# Patient Record
Sex: MALE | Race: WHITE | NOT HISPANIC OR LATINO
[De-identification: names, ages, dates, MRNs, and addresses within clinical notes are randomized per-mention and may not be internally consistent; named-entity substitution may affect disease eponyms.]

---

## 2017-01-17 ENCOUNTER — TRANSCRIPTION ENCOUNTER (OUTPATIENT)
Age: 72
End: 2017-01-17

## 2017-02-24 ENCOUNTER — TRANSCRIPTION ENCOUNTER (OUTPATIENT)
Age: 72
End: 2017-02-24

## 2017-12-16 ENCOUNTER — TRANSCRIPTION ENCOUNTER (OUTPATIENT)
Age: 72
End: 2017-12-16

## 2018-06-12 ENCOUNTER — TRANSCRIPTION ENCOUNTER (OUTPATIENT)
Age: 73
End: 2018-06-12

## 2019-01-14 ENCOUNTER — TRANSCRIPTION ENCOUNTER (OUTPATIENT)
Age: 74
End: 2019-01-14

## 2019-02-12 ENCOUNTER — NON-APPOINTMENT (OUTPATIENT)
Age: 74
End: 2019-02-12

## 2019-02-12 ENCOUNTER — APPOINTMENT (OUTPATIENT)
Dept: CARDIOLOGY | Facility: CLINIC | Age: 74
End: 2019-02-12
Payer: MEDICARE

## 2019-02-12 VITALS
DIASTOLIC BLOOD PRESSURE: 82 MMHG | HEIGHT: 69 IN | BODY MASS INDEX: 31.84 KG/M2 | WEIGHT: 215 LBS | HEART RATE: 75 BPM | SYSTOLIC BLOOD PRESSURE: 134 MMHG

## 2019-02-12 DIAGNOSIS — Z87.891 PERSONAL HISTORY OF NICOTINE DEPENDENCE: ICD-10-CM

## 2019-02-12 DIAGNOSIS — Z78.9 OTHER SPECIFIED HEALTH STATUS: ICD-10-CM

## 2019-02-12 PROCEDURE — 99205 OFFICE O/P NEW HI 60 MIN: CPT

## 2019-02-12 PROCEDURE — 93000 ELECTROCARDIOGRAM COMPLETE: CPT

## 2019-02-12 NOTE — PHYSICAL EXAM
[General Appearance - Well Developed] : well developed [Normal Appearance] : normal appearance [Well Groomed] : well groomed [General Appearance - Well Nourished] : well nourished [No Deformities] : no deformities [General Appearance - In No Acute Distress] : no acute distress [Normal Conjunctiva] : the conjunctiva exhibited no abnormalities [Eyelids - No Xanthelasma] : the eyelids demonstrated no xanthelasmas [Normal Oral Mucosa] : normal oral mucosa [No Oral Pallor] : no oral pallor [No Oral Cyanosis] : no oral cyanosis [FreeTextEntry1] : decr upstroke [Heart Rate And Rhythm] : heart rate and rhythm were normal [Heart Sounds] : normal S1 and S2 [Murmurs] : no murmurs present [Respiration, Rhythm And Depth] : normal respiratory rhythm and effort [Exaggerated Use Of Accessory Muscles For Inspiration] : no accessory muscle use [Auscultation Breath Sounds / Voice Sounds] : lungs were clear to auscultation bilaterally [Abdomen Soft] : soft [Abdomen Tenderness] : non-tender [Abdomen Mass (___ Cm)] : no abdominal mass palpated [Abnormal Walk] : normal gait [Gait - Sufficient For Exercise Testing] : the gait was sufficient for exercise testing [Nail Clubbing] : no clubbing of the fingernails [Cyanosis, Localized] : no localized cyanosis [Petechial Hemorrhages (___cm)] : no petechial hemorrhages [Skin Color & Pigmentation] : normal skin color and pigmentation [] : no rash [No Venous Stasis] : no venous stasis [Skin Lesions] : no skin lesions [No Skin Ulcers] : no skin ulcer [No Xanthoma] : no  xanthoma was observed [Oriented To Time, Place, And Person] : oriented to person, place, and time [Affect] : the affect was normal [Mood] : the mood was normal [No Anxiety] : not feeling anxious

## 2019-02-12 NOTE — HISTORY OF PRESENT ILLNESS
[FreeTextEntry1] : TANA MCKEON  is a 73 year old  M\par History of hypertension, diabetes, family history of cardiovascular disease\par \par Recent exertional symptoms. Symptoms started over past few months. Has symptoms most days. Symptoms last for minutes. \par Describes it as a pinching sensation in his chest. \par There is radiation to his shoulder bilaterally.\par Symptoms associated with activity such as walking uphill with his extra large dog. \par Recent sinus infection and symptoms of fatigue. \par There is no orthopnea. \par There are no symptomatic palpitations, lightheadedness, dizziness or syncope.\par \par History of hypertension, maintained on lisinopril. Reports home blood pressure previously 150s now 130s. \par There is no prior history of a clinical myocardial infarction, coronary revascularization. \par There is no history of symptomatic congestive heart failure rheumatic heart disease or valvular disease.\par There is no history of symptomatic arrhythmias including atrial fibrillation.\par \par EKG demonstrates normal sinus rhythm with left anterior fascicular block poor R wave progression and nonspecific ST changes. \par \[place]. \par Significant family history of carotid disease and father had initial cardiac event in his 50s.\par Underwent remote evaluation at [facility] and at that time was told his heart was enlarged. \par Previously underwent executive physical. \par \par Outside stress test 2008 diaphragmatic attenuation. \par Outside echocardiogram 2006 normal left ventricular function, mild mitral regurgitation, mild aortic dilatation\par November 2018, potassium 5.0, creatinine 1.1, hemoglobin A1c 7.6, HDL 48,

## 2019-02-12 NOTE — REASON FOR VISIT
[Consultation] : a consultation regarding [Chest Pain] : chest pain [Hypertension] : hypertension [Medication Management] : Medication management

## 2019-02-12 NOTE — ASSESSMENT
[FreeTextEntry1] : Exertional symptoms. \par Multiple coronary risk factors, including diabetes, hypertension, family history CVD\par Family history of carotid disease. \par Abnormal EKG. \par \par Daily aspirin therapy. \par Exercise nuclear stress test to evaluate cardiovascular response to exercise and myocardial perfusion. \par Followup carotid ultrasound. \par Screen for abdominal aneurysm. \par \par Likely requires further antihypertensive and statin therapy. \par Return to office with home blood pressure monitor and log.\par Outside blood work requested. \par \par If change in symptoms. 911/immediate reevaluation.\par

## 2019-02-14 RX ORDER — LISINOPRIL 20 MG/1
20 TABLET ORAL
Refills: 0 | Status: DISCONTINUED | COMMUNITY
End: 2019-02-14

## 2019-02-26 ENCOUNTER — APPOINTMENT (OUTPATIENT)
Dept: CARDIOLOGY | Facility: CLINIC | Age: 74
End: 2019-02-26
Payer: MEDICARE

## 2019-02-26 PROCEDURE — 93880 EXTRACRANIAL BILAT STUDY: CPT

## 2019-02-26 PROCEDURE — 93306 TTE W/DOPPLER COMPLETE: CPT

## 2019-02-26 PROCEDURE — 93015 CV STRESS TEST SUPVJ I&R: CPT

## 2019-02-26 PROCEDURE — 78452 HT MUSCLE IMAGE SPECT MULT: CPT

## 2019-02-26 PROCEDURE — A9502: CPT

## 2019-02-26 PROCEDURE — 93979 VASCULAR STUDY: CPT

## 2019-02-27 ENCOUNTER — APPOINTMENT (OUTPATIENT)
Dept: CARDIOLOGY | Facility: CLINIC | Age: 74
End: 2019-02-27
Payer: MEDICARE

## 2019-02-27 VITALS
HEART RATE: 77 BPM | HEIGHT: 69 IN | WEIGHT: 207 LBS | OXYGEN SATURATION: 97 % | DIASTOLIC BLOOD PRESSURE: 70 MMHG | SYSTOLIC BLOOD PRESSURE: 140 MMHG | BODY MASS INDEX: 30.66 KG/M2

## 2019-02-27 DIAGNOSIS — Z87.19 PERSONAL HISTORY OF OTHER DISEASES OF THE DIGESTIVE SYSTEM: ICD-10-CM

## 2019-02-27 DIAGNOSIS — Z83.438 FAMILY HISTORY OF OTHER DISORDER OF LIPOPROTEIN METABOLISM AND OTHER LIPIDEMIA: ICD-10-CM

## 2019-02-27 DIAGNOSIS — Z82.49 FAMILY HISTORY OF ISCHEMIC HEART DISEASE AND OTHER DISEASES OF THE CIRCULATORY SYSTEM: ICD-10-CM

## 2019-02-27 DIAGNOSIS — Z87.09 PERSONAL HISTORY OF OTHER DISEASES OF THE RESPIRATORY SYSTEM: ICD-10-CM

## 2019-02-27 DIAGNOSIS — Z82.3 FAMILY HISTORY OF STROKE: ICD-10-CM

## 2019-02-27 PROCEDURE — 99214 OFFICE O/P EST MOD 30 MIN: CPT

## 2019-03-03 NOTE — REASON FOR VISIT
[Hypertension] : hypertension [Medication Management] : Medication management [Follow-Up - Clinic] : a clinic follow-up of [Hyperlipidemia] : hyperlipidemia

## 2019-03-03 NOTE — ASSESSMENT
[FreeTextEntry1] : Subclinical atherosclerosis\par Mild valvular heart disease\par Borderline aortic dilatation\par Multiple coronary risk factors, including diabetes, hypertension, family history CVD\par No significant ischemic heart disease\par No LV dysfunction\par No indication for invasive therapy at this time. \par \par Continue aspirin therapy. \par Continue statin therapy. \par Followup blood work has been requested. \par Followup with primary physician regarding glucose control.\par Monitor home BP \par \par If change in symptoms. immediate reevaluation.\par

## 2019-03-03 NOTE — PHYSICAL EXAM
[General Appearance - Well Developed] : well developed [Normal Appearance] : normal appearance [Well Groomed] : well groomed [General Appearance - Well Nourished] : well nourished [No Deformities] : no deformities [General Appearance - In No Acute Distress] : no acute distress [Normal Conjunctiva] : the conjunctiva exhibited no abnormalities [Eyelids - No Xanthelasma] : the eyelids demonstrated no xanthelasmas [Normal Oral Mucosa] : normal oral mucosa [No Oral Pallor] : no oral pallor [No Oral Cyanosis] : no oral cyanosis [Respiration, Rhythm And Depth] : normal respiratory rhythm and effort [Exaggerated Use Of Accessory Muscles For Inspiration] : no accessory muscle use [Auscultation Breath Sounds / Voice Sounds] : lungs were clear to auscultation bilaterally [Heart Rate And Rhythm] : heart rate and rhythm were normal [Heart Sounds] : normal S1 and S2 [Murmurs] : no murmurs present [Abdomen Soft] : soft [Abdomen Tenderness] : non-tender [Abdomen Mass (___ Cm)] : no abdominal mass palpated [Abnormal Walk] : normal gait [Gait - Sufficient For Exercise Testing] : the gait was sufficient for exercise testing [Nail Clubbing] : no clubbing of the fingernails [Cyanosis, Localized] : no localized cyanosis [Petechial Hemorrhages (___cm)] : no petechial hemorrhages [Skin Color & Pigmentation] : normal skin color and pigmentation [] : no rash [No Venous Stasis] : no venous stasis [Skin Lesions] : no skin lesions [No Skin Ulcers] : no skin ulcer [No Xanthoma] : no  xanthoma was observed [Oriented To Time, Place, And Person] : oriented to person, place, and time [Affect] : the affect was normal [Mood] : the mood was normal [No Anxiety] : not feeling anxious [FreeTextEntry1] : decr upstroke

## 2019-03-03 NOTE — HISTORY OF PRESENT ILLNESS
[FreeTextEntry1] : TANA MCKEON  is a 73 year old  M\par History of hypertension, diabetes, family history of cardiovascular disease, atheroslcerosis, mld VHD\par \par Recent exertional symptoms. Symptoms started over past few months. Has symptoms most days. Symptoms last for minutes. \par Describes it as a pinching sensation in his chest. \par There is radiation to his shoulder bilaterally.\par Symptoms associated with activity such as walking uphill with his extra large dog. \par Recent sinus infection and symptoms of fatigue. \par There are symptoms of fatigue. \par Monitor his blood pressure home.  Home blood pressure monitor is accurate.\par There is no orthopnea. \par There are no symptomatic palpitations, lightheadedness, dizziness or syncope.\par \par There is no prior history of a clinical myocardial infarction, coronary revascularization. \par There is no history of symptomatic congestive heart failure rheumatic heart disease or valvular disease.\par There is no history of symptomatic arrhythmias including atrial fibrillation.\par \par After last office visit I initiated statin therapy and diuretic\par \par EKG demonstrates normal sinus rhythm with left anterior fascicular block poor R wave progression and nonspecific ST changes. \par \[place] from [employer] industry. \par Significant family history of carotid disease and father had initial cardiac event in his 50s.\par November 2018, potassium 5.0, creatinine 1.1, hemoglobin A1c 7.6, HDL 48,  \par  \par Recent noninvasive testing has been reviewed. \par Carotid Doppler demonstrated mild atherosclerosis. \par Echocardiogram demonstrates normal left ventricular function with mild valvular heart disease and borderline aortic dilatation. \par Abdominal ultrasound demonstrates aortic atherosclerosis. No aneurysm.\par Nuclear stress test. No ischemia. \par

## 2019-05-28 ENCOUNTER — APPOINTMENT (OUTPATIENT)
Dept: CARDIOLOGY | Facility: CLINIC | Age: 74
End: 2019-05-28
Payer: MEDICARE

## 2019-05-28 VITALS
HEIGHT: 69 IN | SYSTOLIC BLOOD PRESSURE: 142 MMHG | DIASTOLIC BLOOD PRESSURE: 82 MMHG | HEART RATE: 84 BPM | BODY MASS INDEX: 30.96 KG/M2 | WEIGHT: 209 LBS

## 2019-05-28 PROCEDURE — 99213 OFFICE O/P EST LOW 20 MIN: CPT

## 2019-05-28 NOTE — HISTORY OF PRESENT ILLNESS
[FreeTextEntry1] : TANA MCKEON  is a 73 year old  M\par \par History of hypertension, diabetes, family history of cardiovascular disease, atheroslcerosis, mild VHD\par \par Active without limitations\par No chest pain\par There is no orthopnea. \par There are no symptomatic palpitations, lightheadedness, dizziness or syncope.\par Minor musculoskeletal complaints.\par \par There is no prior history of a clinical myocardial infarction, coronary revascularization. \par There is no history of symptomatic congestive heart failure rheumatic heart disease\par There is no history of symptomatic arrhythmias including atrial fibrillation.\par \par Presently, he is out of his blood pressure medication.\par \par November 2018, potassium 5.0, creatinine 1.1, hemoglobin A1c 7.6, HDL 48,  \par February 2019. Potassium 4.4, creatinine 1.1, hemoglobin A1c 7.0\par Blood work, March 2019. Hemoglobin 15.1, TSH 1.1 \par \par EKG demonstrates normal sinus rhythm with left anterior fascicular block poor R wave progression and nonspecific ST changes. \par \[place] from [employer] industry. \par Significant family history of carotid disease and father had initial cardiac event in his 50s.\par  \par Recent noninvasive testing has been reviewed. \par Carotid Doppler demonstrated mild atherosclerosis. \par Echocardiogram demonstrates normal left ventricular function with mild valvular heart disease and borderline aortic dilatation. \par Abdominal ultrasound demonstrates aortic atherosclerosis. No aneurysm.\par Nuclear stress test. No ischemia. \par

## 2019-05-28 NOTE — REVIEW OF SYSTEMS
[Negative] : Heme/Lymph [Chest Pain] : no chest pain [see HPI] : see HPI [Joint Stiffness] : joint stiffness [Joint Pain] : joint pain

## 2019-05-28 NOTE — ASSESSMENT
[FreeTextEntry1] : Subclinical atherosclerosis\par Mild valvular heart disease\par Borderline aortic dilatation\par Multiple coronary risk factors, including diabetes, hypertension, family history CVD\par No significant ischemic heart disease\par No LV dysfunction\par No indication for invasive therapy at this time. \par \par Refilled antihypertensive. \par Continue aspirin therapy. \par Continue statin therapy. \par Glucose management with primary physician.\par \par If change in symptoms, immediate reevaluation.\par Blood work has been requested. \par

## 2019-07-10 ENCOUNTER — OUTPATIENT (OUTPATIENT)
Dept: OUTPATIENT SERVICES | Facility: HOSPITAL | Age: 74
LOS: 1 days | End: 2019-07-10

## 2019-08-20 ENCOUNTER — RX RENEWAL (OUTPATIENT)
Age: 74
End: 2019-08-20

## 2019-09-03 ENCOUNTER — OUTPATIENT (OUTPATIENT)
Dept: OUTPATIENT SERVICES | Facility: HOSPITAL | Age: 74
LOS: 1 days | End: 2019-09-03

## 2019-09-06 ENCOUNTER — APPOINTMENT (OUTPATIENT)
Dept: ULTRASOUND IMAGING | Facility: CLINIC | Age: 74
End: 2019-09-06

## 2019-09-06 ENCOUNTER — APPOINTMENT (OUTPATIENT)
Dept: MRI IMAGING | Facility: CLINIC | Age: 74
End: 2019-09-06
Payer: MEDICARE

## 2019-09-06 PROCEDURE — 76882 US LMTD JT/FCL EVL NVASC XTR: CPT | Mod: LT

## 2019-09-06 PROCEDURE — 72148 MRI LUMBAR SPINE W/O DYE: CPT

## 2019-09-17 ENCOUNTER — TRANSCRIPTION ENCOUNTER (OUTPATIENT)
Age: 74
End: 2019-09-17

## 2019-10-07 ENCOUNTER — OUTPATIENT (OUTPATIENT)
Dept: OUTPATIENT SERVICES | Facility: HOSPITAL | Age: 74
LOS: 1 days | End: 2019-10-07
Payer: MEDICARE

## 2019-10-07 PROCEDURE — 93010 ELECTROCARDIOGRAM REPORT: CPT

## 2019-10-21 ENCOUNTER — OUTPATIENT (OUTPATIENT)
Dept: OUTPATIENT SERVICES | Facility: HOSPITAL | Age: 74
LOS: 1 days | End: 2019-10-21

## 2019-11-20 ENCOUNTER — RECORD ABSTRACTING (OUTPATIENT)
Age: 74
End: 2019-11-20

## 2019-12-03 ENCOUNTER — APPOINTMENT (OUTPATIENT)
Dept: CARDIOLOGY | Facility: CLINIC | Age: 74
End: 2019-12-03
Payer: MEDICARE

## 2019-12-03 VITALS
SYSTOLIC BLOOD PRESSURE: 110 MMHG | DIASTOLIC BLOOD PRESSURE: 62 MMHG | HEART RATE: 90 BPM | OXYGEN SATURATION: 95 % | WEIGHT: 206 LBS | HEIGHT: 69 IN | BODY MASS INDEX: 30.51 KG/M2

## 2019-12-03 DIAGNOSIS — Z00.00 ENCOUNTER FOR GENERAL ADULT MEDICAL EXAMINATION W/OUT ABNORMAL FINDINGS: ICD-10-CM

## 2019-12-03 PROCEDURE — 99213 OFFICE O/P EST LOW 20 MIN: CPT

## 2019-12-03 NOTE — REASON FOR VISIT
[Follow-Up - Clinic] : a clinic follow-up of [Hyperlipidemia] : hyperlipidemia [Medication Management] : Medication management [Hypertension] : hypertension

## 2019-12-09 NOTE — ASSESSMENT
[FreeTextEntry1] : \par Atherosclerosis\par Mild valvular heart disease\par Borderline aortic dilatation\par Multiple coronary risk factors, including diabetes, hypertension, family history CVD\par No significant ischemic heart disease\par No LV dysfunction\par No indication for invasive therapy at this time. \par \par Continue aspirin therapy. \par Continue antihypertensive. \par Continue statin therapy. \par Glucose management with primary physician.\par \par Followup blood work has been requested. Increase aerobic exercise program as tolerated.\par \par If change in symptoms, immediate reevaluation.\par

## 2019-12-09 NOTE — HISTORY OF PRESENT ILLNESS
[FreeTextEntry1] : TANA MCKEON  is a 74 year old  M\par \par \par History of hypertension, diabetes, family history of cardiovascular disease, atheroslcerosis, mild VHD\par \par There is no prior history of a clinical myocardial infarction, coronary revascularization. \par There is no history of symptomatic congestive heart failure rheumatic heart disease\par There is no history of symptomatic arrhythmias including atrial fibrillation.\par \par Recently, he had surgery, and overall less active. \par There is minor weight loss.\par No chest pain\par There is no orthopnea. \par There are no symptomatic palpitations, lightheadedness, dizziness or syncope.\par Minor musculoskeletal complaints.\par \[place] from [employer]. \par Significant family history of carotid disease and father had initial cardiac event in his 50s.\par \par May 2019, hemoglobin 14.7, creatinine 1.1, LDL 64, A1c 7.3, TSH 1.7. \par \par EKG demonstrates normal sinus rhythm with left anterior fascicular block poor R wave progression and nonspecific ST changes. \par \par Carotid Doppler 2/19 demonstrated mild atherosclerosis. \par Echocardiogram 2/19 demonstrated normal left ventricular function with mild valvular heart disease and borderline aortic dilatation. \par Abdominal ultrasound 2/19 demonstrates aortic atherosclerosis. No aneurysm.\par Nuclear stress test 2/19. No ischemia.

## 2019-12-09 NOTE — REVIEW OF SYSTEMS
[see HPI] : see HPI [Joint Stiffness] : joint stiffness [Joint Pain] : joint pain [Negative] : Heme/Lymph [Chest Pain] : no chest pain

## 2020-11-23 ENCOUNTER — NON-APPOINTMENT (OUTPATIENT)
Age: 75
End: 2020-11-23

## 2020-11-23 DIAGNOSIS — K57.90 DIVERTICULOSIS OF INTESTINE, PART UNSPECIFIED, W/OUT PERFORATION OR ABSCESS W/OUT BLEEDING: ICD-10-CM

## 2020-12-03 ENCOUNTER — APPOINTMENT (OUTPATIENT)
Dept: INTERNAL MEDICINE | Facility: CLINIC | Age: 75
End: 2020-12-03
Payer: MEDICARE

## 2020-12-03 VITALS
BODY MASS INDEX: 30.81 KG/M2 | SYSTOLIC BLOOD PRESSURE: 160 MMHG | DIASTOLIC BLOOD PRESSURE: 78 MMHG | HEIGHT: 69 IN | WEIGHT: 208 LBS

## 2020-12-03 VITALS — SYSTOLIC BLOOD PRESSURE: 136 MMHG | DIASTOLIC BLOOD PRESSURE: 86 MMHG

## 2020-12-03 DIAGNOSIS — Z01.84 ENCOUNTER FOR ANTIBODY RESPONSE EXAMINATION: ICD-10-CM

## 2020-12-03 PROCEDURE — 36415 COLL VENOUS BLD VENIPUNCTURE: CPT

## 2020-12-03 PROCEDURE — 99214 OFFICE O/P EST MOD 30 MIN: CPT | Mod: 25

## 2020-12-03 NOTE — REVIEW OF SYSTEMS
[Fever] : no fever [Chills] : no chills [Chest Pain] : no chest pain [Palpitations] : no palpitations [Lower Ext Edema] : no lower extremity edema [Orthopena] : no orthopnea [Shortness Of Breath] : no shortness of breath [Wheezing] : no wheezing [Abdominal Pain] : no abdominal pain [Nausea] : no nausea [Diarrhea] : no diarrhea [Vomiting] : no vomiting [Dysuria] : no dysuria [Muscle Pain] : no muscle pain [Muscle Weakness] : no muscle weakness [Joint Swelling] : no joint swelling [Skin Rash] : no skin rash [Headache] : no headache [Dizziness] : no dizziness [Fainting] : no fainting [Memory Loss] : no memory loss [Insomnia] : no insomnia

## 2020-12-03 NOTE — HISTORY OF PRESENT ILLNESS
[de-identified] : 76 y/o male is in the office for follow up, yearly checkup and fasting labs.\ariana Has been well without any recent illness or other problems.\ariana Has been compliant with his medications.\ariana Is also asking about Cialis use.\par Blood pressure has been good when checked.

## 2020-12-03 NOTE — PHYSICAL EXAM
[No Respiratory Distress] : no respiratory distress  [Clear to Auscultation] : lungs were clear to auscultation bilaterally [Normal] : normal rate, regular rhythm, normal S1 and S2 and no murmur heard [No Carotid Bruits] : no carotid bruits [No Edema] : there was no peripheral edema [Soft] : abdomen soft [Non Tender] : non-tender [No Rash] : no rash [No Focal Deficits] : no focal deficits [Normal Gait] : normal gait [Normal Insight/Judgement] : insight and judgment were intact

## 2020-12-04 LAB
ALBUMIN SERPL ELPH-MCNC: 4.6 G/DL
ALP BLD-CCNC: 61 U/L
ALT SERPL-CCNC: 52 U/L
ANION GAP SERPL CALC-SCNC: 14 MMOL/L
AST SERPL-CCNC: 42 U/L
BASOPHILS # BLD AUTO: 0.07 K/UL
BASOPHILS NFR BLD AUTO: 1.4 %
BILIRUB SERPL-MCNC: 0.8 MG/DL
BUN SERPL-MCNC: 10 MG/DL
CALCIUM SERPL-MCNC: 9.3 MG/DL
CHLORIDE SERPL-SCNC: 99 MMOL/L
CHOLEST SERPL-MCNC: 198 MG/DL
CO2 SERPL-SCNC: 22 MMOL/L
CREAT SERPL-MCNC: 1.17 MG/DL
EOSINOPHIL # BLD AUTO: 0.1 K/UL
EOSINOPHIL NFR BLD AUTO: 1.9 %
GLUCOSE SERPL-MCNC: 150 MG/DL
HCT VFR BLD CALC: 46.5 %
HDLC SERPL-MCNC: 51 MG/DL
HGB BLD-MCNC: 15.4 G/DL
IMM GRANULOCYTES NFR BLD AUTO: 0.6 %
LDLC SERPL CALC-MCNC: 123 MG/DL
LYMPHOCYTES # BLD AUTO: 1.36 K/UL
LYMPHOCYTES NFR BLD AUTO: 26.5 %
MAN DIFF?: NORMAL
MCHC RBC-ENTMCNC: 29.9 PG
MCHC RBC-ENTMCNC: 33.1 GM/DL
MCV RBC AUTO: 90.3 FL
MONOCYTES # BLD AUTO: 0.5 K/UL
MONOCYTES NFR BLD AUTO: 9.7 %
NEUTROPHILS # BLD AUTO: 3.08 K/UL
NEUTROPHILS NFR BLD AUTO: 59.9 %
NONHDLC SERPL-MCNC: 147 MG/DL
PLATELET # BLD AUTO: 237 K/UL
POTASSIUM SERPL-SCNC: 4 MMOL/L
PROT SERPL-MCNC: 7 G/DL
RBC # BLD: 5.15 M/UL
RBC # FLD: 12.6 %
SODIUM SERPL-SCNC: 135 MMOL/L
TRIGL SERPL-MCNC: 124 MG/DL
TSH SERPL-ACNC: 1.41 UIU/ML
WBC # FLD AUTO: 5.14 K/UL

## 2020-12-07 LAB
ESTIMATED AVERAGE GLUCOSE: 177 MG/DL
HBA1C MFR BLD HPLC: 7.8 %
SARS-COV-2 IGG SERPL IA-ACNC: <0.1 INDEX
SARS-COV-2 IGG SERPL QL IA: NEGATIVE

## 2021-01-11 ENCOUNTER — RESULT CHARGE (OUTPATIENT)
Age: 76
End: 2021-01-11

## 2021-01-12 ENCOUNTER — NON-APPOINTMENT (OUTPATIENT)
Age: 76
End: 2021-01-12

## 2021-01-12 ENCOUNTER — APPOINTMENT (OUTPATIENT)
Dept: CARDIOLOGY | Facility: CLINIC | Age: 76
End: 2021-01-12
Payer: MEDICARE

## 2021-01-12 VITALS
HEIGHT: 70 IN | SYSTOLIC BLOOD PRESSURE: 148 MMHG | BODY MASS INDEX: 29.78 KG/M2 | WEIGHT: 208 LBS | OXYGEN SATURATION: 97 % | HEART RATE: 85 BPM | DIASTOLIC BLOOD PRESSURE: 70 MMHG | TEMPERATURE: 97.9 F

## 2021-01-12 PROCEDURE — 99214 OFFICE O/P EST MOD 30 MIN: CPT

## 2021-01-12 RX ORDER — ATORVASTATIN CALCIUM 20 MG/1
20 TABLET, FILM COATED ORAL DAILY
Qty: 90 | Refills: 1 | Status: DISCONTINUED | COMMUNITY
Start: 2019-02-14 | End: 2021-01-12

## 2021-01-17 NOTE — ASSESSMENT
[FreeTextEntry1] : Abnormal EKG.\par Recent labs with rise in his cholesterol and A1c  \par I discussed importance of risk factor modification \par On my examination the systolic blood pressure is borderline \par Recommended addition of lisinopril 10 mg in the evening \par intolerance to Lipitor start Crestor instructed to call if adverse effect \par instructed to notify our office if any further symptoms\par \par Atherosclerosis\par Mild valvular heart disease\par Borderline aortic dilatation\par Multiple coronary risk factors, including diabetes, hypertension, family history CVD\par No significant ischemic heart disease\par No LV dysfunction\par \par Continue aspirin therapy. \par Continue antihypertensive. \par Continue statin therapy. \par Glucose management with primary physician.\par Increase aerobic exercise program as tolerated.\par \par Will readdress echo and cardiac CT at office follow-up when pandemic improves, sooner if there are new sx

## 2021-01-17 NOTE — HISTORY OF PRESENT ILLNESS
[FreeTextEntry1] : ATNA MCKEON  is a 75 year old  M\par \par \par \par History of hypertension, diabetes, family history of cardiovascular disease, atheroslcerosis, mild VHD\par \par There is no prior history of a clinical myocardial infarction, coronary revascularization. \par There is no history of symptomatic congestive heart failure rheumatic heart disease\par There is no history of symptomatic arrhythmias including atrial fibrillation.\par \par Prior chest discomfort improved after a steroid injection to his shoulder\par No further chest pain\par There is no orthopnea. \par There are no symptomatic palpitations, lightheadedness, dizziness or syncope.\par Minor musculoskeletal complaints.\par \par January 2020 hemoglobin 14.7 creatinine 1.1 LDL 61 A1c 7.0 \par Follow-up lipid profile total cholesterol 198  creatinine 1.2 AST 42 ALT 5 2 hemoglobin 15.4 A1c 7.8 \par \[place]. \par Significant family history of carotid disease and father had initial cardiac event in his 50s.\par \par EKG demonstrates normal sinus rhythm with left anterior fascicular block poor R wave progression and nonspecific ST changes. \par \par Carotid Doppler 2/19 demonstrated mild atherosclerosis. \par Echocardiogram 2/19 demonstrated normal left ventricular function with mild valvular heart disease and borderline aortic dilatation. \par Abdominal ultrasound 2/19 demonstrates aortic atherosclerosis. No aneurysm.\par Nuclear stress test 2/19. No ischemia.

## 2021-03-17 ENCOUNTER — APPOINTMENT (OUTPATIENT)
Dept: CARDIOLOGY | Facility: CLINIC | Age: 76
End: 2021-03-17
Payer: MEDICARE

## 2021-03-17 VITALS
DIASTOLIC BLOOD PRESSURE: 70 MMHG | BODY MASS INDEX: 30.13 KG/M2 | WEIGHT: 210 LBS | OXYGEN SATURATION: 98 % | TEMPERATURE: 98.4 F | SYSTOLIC BLOOD PRESSURE: 108 MMHG | HEART RATE: 74 BPM

## 2021-03-17 DIAGNOSIS — R07.89 OTHER CHEST PAIN: ICD-10-CM

## 2021-03-17 DIAGNOSIS — Z87.898 PERSONAL HISTORY OF OTHER SPECIFIED CONDITIONS: ICD-10-CM

## 2021-03-17 PROCEDURE — 99213 OFFICE O/P EST LOW 20 MIN: CPT

## 2021-03-18 PROBLEM — Z87.898 HISTORY OF DIZZINESS: Status: RESOLVED | Noted: 2019-02-12 | Resolved: 2021-03-18

## 2021-03-18 PROBLEM — Z87.898 HISTORY OF SHORTNESS OF BREATH: Status: RESOLVED | Noted: 2019-02-12 | Resolved: 2021-03-18

## 2021-03-18 PROBLEM — R07.89 CHEST DISCOMFORT: Status: RESOLVED | Noted: 2019-02-12 | Resolved: 2021-03-18

## 2021-03-18 NOTE — HISTORY OF PRESENT ILLNESS
[FreeTextEntry1] : TANA MCKEON  is a 75 year old  M\par \par \par History of hypertension, diabetes, family history of cardiovascular disease, atheroslcerosis, mild VHD\par \par There is no prior history of a clinical myocardial infarction, coronary revascularization. \par There is no history of symptomatic congestive heart failure rheumatic heart disease\par There is no history of symptomatic arrhythmias including atrial fibrillation.\par \par Prior chest discomfort improved after a steroid injection to his shoulder\par No further chest pain\par There is no orthopnea. \par There are no symptomatic palpitations, lightheadedness, dizziness or syncope.\par Minor musculoskeletal complaints.\par \par he did have lower blood pressure in the setting of an upper respiratory illness.  \par On multiple exams today his blood pressure systolic is 120s. \par He does take the Crestor without side effects.  \par \par January 2020 hemoglobin 14.7 creatinine 1.1 LDL 61 A1c 7.0 \par Follow-up lipid profile total cholesterol 198  creatinine 1.2 AST 42 ALT 5 2 hemoglobin 15.4 A1c 7.8 \par \[place] industry. \par Significant family history of carotid disease and father had initial cardiac event in his 50s.\par \par EKG demonstrates normal sinus rhythm with left anterior fascicular block poor R wave progression and nonspecific ST changes. \par \par Carotid Doppler 2/19 demonstrated mild atherosclerosis. \par Echocardiogram 2/19 demonstrated normal left ventricular function with mild valvular heart disease and borderline aortic dilatation. \par Abdominal ultrasound 2/19 demonstrates aortic atherosclerosis. No aneurysm.\par Nuclear stress test 2/19. No ischemia. \par

## 2021-03-18 NOTE — ASSESSMENT
[FreeTextEntry1] : \par Abnormal EKG.\par Recent labs with rise in his cholesterol and A1c  \par I discussed importance of risk factor modification \par intolerance to Lipitor \par instructed to notify our office if any further symptoms\par \par Atherosclerosis\par Mild valvular heart disease\par Borderline aortic dilatation\par Multiple coronary risk factors, including diabetes, hypertension, family history CVD\par No significant ischemic heart disease\par No LV dysfunction\par \par Continue aspirin therapy. \par Continue antihypertensive. \par Continue statin therapy. \par Glucose management with primary physician.\par Increase aerobic exercise program as tolerated.\par \par Upcoming blood work has been requested \par Eventual cardiac CTA\par Clinical improvement in blood pressure \par I discussed alternative lipid-lowering therapy if intolerant to current medications

## 2021-03-22 ENCOUNTER — OUTPATIENT (OUTPATIENT)
Dept: OUTPATIENT SERVICES | Facility: HOSPITAL | Age: 76
LOS: 1 days | End: 2021-03-22

## 2021-04-12 ENCOUNTER — TRANSCRIPTION ENCOUNTER (OUTPATIENT)
Age: 76
End: 2021-04-12

## 2021-04-23 ENCOUNTER — APPOINTMENT (OUTPATIENT)
Dept: INTERNAL MEDICINE | Facility: CLINIC | Age: 76
End: 2021-04-23
Payer: MEDICARE

## 2021-04-23 VITALS — HEART RATE: 70 BPM | DIASTOLIC BLOOD PRESSURE: 78 MMHG | SYSTOLIC BLOOD PRESSURE: 138 MMHG

## 2021-04-23 DIAGNOSIS — M47.812 SPONDYLOSIS W/OUT MYELOPATHY OR RADICULOPATHY, CERVICAL REGION: ICD-10-CM

## 2021-04-23 PROCEDURE — 99213 OFFICE O/P EST LOW 20 MIN: CPT | Mod: 25

## 2021-04-23 PROCEDURE — 36415 COLL VENOUS BLD VENIPUNCTURE: CPT

## 2021-04-23 NOTE — ASSESSMENT
[FreeTextEntry1] : Diabetes–follow-up A1c was sent.  He will continue with Januvia 100 mg daily for now.  He was told if the A1c remains elevated adjustments of medication can be made.  He was also encouraged to be more careful with his diet and try to lose some weight again.\par \par Headaches/neck arthritis–he was told that the headaches have a number of possibilities.  At this point the seem to be will consistent with the apparent neck arthritis.  He has had some improvement since starting Flonase so allergies can also be a component.  If they persist or worsen he was told he will be referred to a neurologist for further evaluation.

## 2021-04-23 NOTE — REVIEW OF SYSTEMS
[Joint Pain] : joint pain [Headache] : headache [Dizziness] : dizziness [Fever] : no fever [Chills] : no chills [Chest Pain] : no chest pain [Shortness Of Breath] : no shortness of breath [Fainting] : no fainting [Confusion] : no confusion [FreeTextEntry9] : Neck pain and occasional cracking with range of motion

## 2021-04-23 NOTE — HISTORY OF PRESENT ILLNESS
[de-identified] : He presents complaining of recent posterior headaches and intermittent neck pain.  He also states he felt a little fuzzy at times with headaches.  He was seen at a walk-in with congestion and was given Flonase which he states it seems to be helping that feeling.  He is concerned about his A1c since it has been somewhat elevated at the last visit and he has not been particularly careful with his diet.  He has been compliant with the Januvia without any problems.  In the past he did not tolerate Metformin because of GI side effects.

## 2021-04-23 NOTE — PHYSICAL EXAM
[No JVD] : no jugular venous distention [Clear to Auscultation] : lungs were clear to auscultation bilaterally [Regular Rhythm] : with a regular rhythm [Normal S1, S2] : normal S1 and S2 [No Edema] : there was no peripheral edema [No Focal Deficits] : no focal deficits [Normal] : affect was normal and insight and judgment were intact [de-identified] : Neck range of motion intact/nontender to palpation

## 2021-07-06 ENCOUNTER — APPOINTMENT (OUTPATIENT)
Dept: CARDIOLOGY | Facility: CLINIC | Age: 76
End: 2021-07-06
Payer: MEDICARE

## 2021-07-06 VITALS
HEIGHT: 70 IN | OXYGEN SATURATION: 97 % | SYSTOLIC BLOOD PRESSURE: 118 MMHG | HEART RATE: 75 BPM | DIASTOLIC BLOOD PRESSURE: 64 MMHG | WEIGHT: 210 LBS | TEMPERATURE: 98.4 F | BODY MASS INDEX: 30.06 KG/M2

## 2021-07-06 PROCEDURE — 99214 OFFICE O/P EST MOD 30 MIN: CPT

## 2021-07-06 RX ORDER — MULTIVITAMIN
TABLET ORAL
Refills: 0 | Status: DISCONTINUED | COMMUNITY
End: 2021-07-06

## 2021-07-07 NOTE — ASSESSMENT
[FreeTextEntry1] : asked him to restart his statin therapy \par follow-up with primary physician regarding management of diabetes favor use of SGLT2 inhibitor \par emphasized importance of lipid and glucose control\par requested upcoming blood work \par cardiac CTA has been requested to rule out obstructive coronary disease\par history of aortic atherosclerosis carotid atherosclerosis \par last stress test was notable for a limited functional status equivocal EKG response borderline perfusion defect with concomitant attenuation\par \par Abnormal EKG.\par Recent labs with rise in his cholesterol and A1c  \par intolerance to Lipitor \par \par Atherosclerosis\par Mild valvular heart disease\par Multiple coronary risk factors, including diabetes, hypertension, family history CVD\par \par Continue aspirin therapy. \par Continue antihypertensive. \par Continue statin therapy. \par Glucose management with primary physician.\par \par discussed alternative lipid-lowering therapy if intolerant to current medications\par

## 2021-07-07 NOTE — HISTORY OF PRESENT ILLNESS
[FreeTextEntry1] : TANA MCKEON  is a 75 year old  M\par \par \par History of hypertension, diabetes, family history of cardiovascular disease, atheroslcerosis, mild VHD\par \par There is no prior history of a clinical myocardial infarction, coronary revascularization. \par There is no history of symptomatic congestive heart failure rheumatic heart disease\par There is no history of symptomatic arrhythmias including atrial fibrillation.\par \par he does report having symptoms of fatigue \par his left shoulder bothers him \par he has a reduction in his exercise tolerance\par There is no orthopnea. \par There are no symptomatic palpitations, lightheadedness, dizziness or syncope.\par \par Recently his sugar was more elevated and he was started on Actos \par he does report having indigestion with the Actos and Crestor \par he stopped both of these medications \par   \[place]. \par Significant family history of carotid disease and father had initial cardiac event in his 50s.\par EKG demonstrates normal sinus rhythm with left anterior fascicular block poor R wave progression and nonspecific ST changes. \par Carotid Doppler 2/19 demonstrated mild atherosclerosis. \par Echocardiogram 2/19 demonstrated normal left ventricular function with mild valvular heart disease and borderline aortic dilatation. \par Abdominal ultrasound 2/19 demonstrates aortic atherosclerosis. No aneurysm.\par \par

## 2021-08-04 ENCOUNTER — APPOINTMENT (OUTPATIENT)
Dept: INTERNAL MEDICINE | Facility: CLINIC | Age: 76
End: 2021-08-04
Payer: MEDICARE

## 2021-08-04 VITALS
HEIGHT: 70 IN | WEIGHT: 212 LBS | BODY MASS INDEX: 30.35 KG/M2 | DIASTOLIC BLOOD PRESSURE: 70 MMHG | SYSTOLIC BLOOD PRESSURE: 120 MMHG

## 2021-08-04 DIAGNOSIS — N40.0 BENIGN PROSTATIC HYPERPLASIA WITHOUT LOWER URINARY TRACT SYMPMS: ICD-10-CM

## 2021-08-04 PROCEDURE — G0439: CPT

## 2021-08-04 PROCEDURE — 36415 COLL VENOUS BLD VENIPUNCTURE: CPT

## 2021-08-04 NOTE — HEALTH RISK ASSESSMENT
[No] : In the past 12 months have you used drugs other than those required for medical reasons? No [No falls in past year] : Patient reported no falls in the past year [0] : 2) Feeling down, depressed, or hopeless: Not at all (0) [PHQ-2 Negative - No further assessment needed] : PHQ-2 Negative - No further assessment needed [] : No [Patient reported colonoscopy was normal] : Patient reported colonoscopy was normal [ColonoscopyDate] : 02/16

## 2021-08-04 NOTE — ASSESSMENT
[FreeTextEntry1] : Exam essentially unchanged.\par \par Diabetes–follow-up A1c was sent.  His most recent A1c on 4/23 was 7.4.  For now he is on Januvia 100 mg daily as well as Actos 15.  He was told that the A1c remains elevated we will consider increasing the Actos first.\par \par Hypertension–his blood pressure is well controlled and he is going to continue lisinopril HCT 20/12.5 in the AM and lisinopril 10 mg in the p.m.\par \par Hyperlipidemia–follow-up fasting labs are sent.  He is presently on Crestor 10 mg and just increased to 20 mg yesterday.\par \par History of CAD–his recent CT angiogram did not reveal any hemodynamically significant lesions and continue risk factor modification was discussed.  He will follow-up with his cardiologist as scheduled.\par \par Follow-up PSA was sent.

## 2021-08-04 NOTE — HISTORY OF PRESENT ILLNESS
[de-identified] : 74 y/o presents for annual Medicare wellness visit and fasting labs.\par He has a history of diabetes, hypertension and hyperlipidemia.  He has been doing generally well and has been compliant with medications.  He recently had a CT angiogram done by his cardiologist that revealed various areas of stenosis but none were found to be hemodynamically significant.  His dose of Crestor was increased to 20 mg yesterday after results became available.  He is asymptomatic.

## 2021-08-04 NOTE — PHYSICAL EXAM
[No Acute Distress] : no acute distress [No JVD] : no jugular venous distention [Clear to Auscultation] : lungs were clear to auscultation bilaterally [Normal] : normal rate, regular rhythm, normal S1 and S2 and no murmur heard [No Carotid Bruits] : no carotid bruits [No Edema] : there was no peripheral edema [Non Tender] : non-tender [No Joint Swelling] : no joint swelling [No Focal Deficits] : no focal deficits [Alert and Oriented x3] : oriented to person, place, and time

## 2021-08-04 NOTE — REVIEW OF SYSTEMS
[Fever] : no fever [Chest Pain] : no chest pain [Palpitations] : no palpitations [Lower Ext Edema] : no lower extremity edema [Shortness Of Breath] : no shortness of breath [Dyspnea on Exertion] : not dyspnea on exertion [Abdominal Pain] : no abdominal pain [Muscle Weakness] : no muscle weakness [Joint Swelling] : no joint swelling [Headache] : no headache [Dizziness] : no dizziness [Negative] : Psychiatric

## 2021-08-05 LAB
ALBUMIN SERPL ELPH-MCNC: 4.4 G/DL
ALP BLD-CCNC: 65 U/L
ALT SERPL-CCNC: 50 U/L
ANION GAP SERPL CALC-SCNC: 13 MMOL/L
AST SERPL-CCNC: 28 U/L
BASOPHILS # BLD AUTO: 0.04 K/UL
BASOPHILS NFR BLD AUTO: 0.8 %
BILIRUB SERPL-MCNC: 0.9 MG/DL
BUN SERPL-MCNC: 17 MG/DL
CALCIUM SERPL-MCNC: 9.2 MG/DL
CHLORIDE SERPL-SCNC: 102 MMOL/L
CHOLEST SERPL-MCNC: 107 MG/DL
CO2 SERPL-SCNC: 23 MMOL/L
CREAT SERPL-MCNC: 1.33 MG/DL
EOSINOPHIL # BLD AUTO: 0.05 K/UL
EOSINOPHIL NFR BLD AUTO: 1 %
ESTIMATED AVERAGE GLUCOSE: 148 MG/DL
GLUCOSE SERPL-MCNC: 122 MG/DL
HBA1C MFR BLD HPLC: 6.8 %
HCT VFR BLD CALC: 41.6 %
HDLC SERPL-MCNC: 40 MG/DL
HGB BLD-MCNC: 13.8 G/DL
IMM GRANULOCYTES NFR BLD AUTO: 1.2 %
LDLC SERPL CALC-MCNC: 46 MG/DL
LYMPHOCYTES # BLD AUTO: 2.29 K/UL
LYMPHOCYTES NFR BLD AUTO: 44.2 %
MAN DIFF?: NORMAL
MCHC RBC-ENTMCNC: 29.6 PG
MCHC RBC-ENTMCNC: 33.2 GM/DL
MCV RBC AUTO: 89.1 FL
MONOCYTES # BLD AUTO: 0.59 K/UL
MONOCYTES NFR BLD AUTO: 11.4 %
NEUTROPHILS # BLD AUTO: 2.15 K/UL
NEUTROPHILS NFR BLD AUTO: 41.4 %
NONHDLC SERPL-MCNC: 66 MG/DL
PLATELET # BLD AUTO: 180 K/UL
POTASSIUM SERPL-SCNC: 4.2 MMOL/L
PROT SERPL-MCNC: 6.7 G/DL
PSA SERPL-MCNC: 1.65 NG/ML
RBC # BLD: 4.67 M/UL
RBC # FLD: 12.9 %
SODIUM SERPL-SCNC: 138 MMOL/L
TRIGL SERPL-MCNC: 101 MG/DL
WBC # FLD AUTO: 5.18 K/UL

## 2021-08-17 ENCOUNTER — APPOINTMENT (OUTPATIENT)
Dept: CARDIOLOGY | Facility: CLINIC | Age: 76
End: 2021-08-17
Payer: MEDICARE

## 2021-08-17 VITALS
DIASTOLIC BLOOD PRESSURE: 72 MMHG | OXYGEN SATURATION: 99 % | BODY MASS INDEX: 29.78 KG/M2 | SYSTOLIC BLOOD PRESSURE: 120 MMHG | HEIGHT: 70 IN | TEMPERATURE: 97.8 F | WEIGHT: 208 LBS | HEART RATE: 75 BPM

## 2021-08-17 DIAGNOSIS — I70.0 ATHEROSCLEROSIS OF AORTA: ICD-10-CM

## 2021-08-17 PROCEDURE — 99214 OFFICE O/P EST MOD 30 MIN: CPT

## 2021-08-19 NOTE — ADDENDUM
[FreeTextEntry1] : Please note the patient was seen and examined with NP Sussy Enriquez\par I was physically present during the service of the patient and personally examined the patient. \ariana I was directly involved in the management plan and recommendations of the care provided to the patient. \par I personally reviewed the history and physical examination as documented by the NP above.\par 08/17/2021\par

## 2021-08-19 NOTE — ASSESSMENT
[FreeTextEntry1] : TANA MCKEON is a 75 year old M who presents today Aug 17, 2021 to review CCTA\par \par Nonobstructive CAD is noted by CTA. \par Pt has no new exertional symptoms at good functional status. Normal EF. \par Risk factor modification and aggressive medical management discussed. \par ASA, statin, glucose management. \par Last ischemic eval >2 yrs ago, so will obtain a nuclear stress test to evaluate for evidence of myocardial ischemia. \par Discussed low threshold if any high grade symptoms or significant ischemia on imaging advised invasive angiography. \par \par history of aortic atherosclerosis carotid atherosclerosis \par ASA, statin as above. Surveillance monitoring advised. \par \par HLD\par Good LDL response <50 on crestor 10mg daily. With CAD discovered on CTA pt now taking 20mg daily no adverse effect. Plan for repeat lipids/CMP/CK in 2 mo on new dosage and titrate as tolerated. \par \par Mild valvular heart disease\par No CHF. Surveillance monitoring advised. No SBE prophylaxis required. \par \par Multiple coronary risk factors, including diabetes, hypertension, family history CVD\par Continue aspirin therapy. \par Continue antihypertensive. \par Continue statin therapy. \par Glucose management with primary physician.\par follow-up with primary physician regarding management of diabetes favor use of SGLT2 inhibitor \par emphasized importance of lipid and glucose control\par requested upcoming blood work \par \par F/U will be planned in the fall following his stress test/labs with Dr. Sanchez. \par Any questions and concerns were addressed and resolved. \par \par Sincerely,\par \par BRENDA Rodriguez\par Patients history, testing, and plan reviewed with supervising MD: Dr. Bhavin Sanchez

## 2021-08-19 NOTE — HISTORY OF PRESENT ILLNESS
[FreeTextEntry1] : TANA MCKEON  is a 75 year old  M here to review cardiovascular test results. \par \par History of hypertension, diabetes, family history of cardiovascular disease, atherosclerosis, mild VHD\par \par There is no prior history of a clinical myocardial infarction, coronary revascularization. \par There is no history of symptomatic congestive heart failure rheumatic heart disease\par There is no history of symptomatic arrhythmias including atrial fibrillation.\par \par he does report having symptoms of fatigue \par his left shoulder bothers him \par he has a reduction in his exercise tolerance\par There is no orthopnea. \par There are no symptomatic palpitations, lightheadedness, dizziness or syncope.\par \par Recently his sugar was more elevated and he was started on Actos \par he does report having indigestion with the Actos and Crestor \par he stopped both of these medications \par \par CCTA performed d/t borderline nuclear stress test findings 2019. Pt denies any new exertional symptoms, walks his dog regularly. \par \par CTA 8/2/21, Calcium score 847, increase Rosuvastatin dose to 20mg daily\par LAD and 1st diag <50% stenosis\par Ramus <40%\par FFR shows no hemodynamically significant stenosis of the LAD\par distal LCx and RCA reportedly <30% stenosis but by FFR "may indicate functional significance" \par \par Labs 8/5/21 hgb 13.8, A1c 6.8, LDL 46, HDL 40, k 4.2, creat 1.33\par   \[place] industry. \par Significant family history of carotid disease and father had initial cardiac event in his 50s.\par EKG demonstrates normal sinus rhythm with left anterior fascicular block poor R wave progression and nonspecific ST changes. \par Carotid Doppler 2/19 demonstrated mild atherosclerosis. \par Echocardiogram 2/19 demonstrated normal left ventricular function with mild valvular heart disease and borderline aortic dilatation. \par Abdominal ultrasound 2/19 demonstrates aortic atherosclerosis. No aneurysm.\par \par

## 2021-08-24 ENCOUNTER — RX RENEWAL (OUTPATIENT)
Age: 76
End: 2021-08-24

## 2021-08-24 DIAGNOSIS — B36.9 SUPERFICIAL MYCOSIS, UNSPECIFIED: ICD-10-CM

## 2021-10-08 ENCOUNTER — TRANSCRIPTION ENCOUNTER (OUTPATIENT)
Age: 76
End: 2021-10-08

## 2021-10-28 ENCOUNTER — NON-APPOINTMENT (OUTPATIENT)
Age: 76
End: 2021-10-28

## 2021-10-28 ENCOUNTER — APPOINTMENT (OUTPATIENT)
Dept: INTERNAL MEDICINE | Facility: CLINIC | Age: 76
End: 2021-10-28
Payer: MEDICARE

## 2021-10-28 VITALS
BODY MASS INDEX: 29.78 KG/M2 | SYSTOLIC BLOOD PRESSURE: 132 MMHG | WEIGHT: 208 LBS | DIASTOLIC BLOOD PRESSURE: 60 MMHG | HEIGHT: 70 IN

## 2021-10-28 PROCEDURE — 99214 OFFICE O/P EST MOD 30 MIN: CPT

## 2021-10-28 RX ORDER — BLOOD-GLUCOSE METER
W/DEVICE KIT MISCELLANEOUS
Qty: 1 | Refills: 0 | Status: ACTIVE | COMMUNITY
Start: 2021-10-28 | End: 1900-01-01

## 2021-10-28 NOTE — HISTORY OF PRESENT ILLNESS
[de-identified] : 77 y/o presents for follow up.\par He has a history of diabetes, hyperlipidemia, and CAD/hypertension.\par He states that for the past few months in the late morning into the afternoon after taking his morning medications that he gets a sensation of pressure and pain in the back of his head and neck along with fatigue malaise that caused him to lie down.  Resolved spontaneously each time.  It seems to be fairly consistent.  He denies any related symptoms.  Denies any chest pain or shortness of breath.

## 2021-10-28 NOTE — PHYSICAL EXAM
[No Acute Distress] : no acute distress [No JVD] : no jugular venous distention [Normal Rate] : normal rate  [Regular Rhythm] : with a regular rhythm [No Edema] : there was no peripheral edema [No Focal Deficits] : no focal deficits

## 2021-10-28 NOTE — ASSESSMENT
[FreeTextEntry1] : Headache/fatigue–he was concerned that it was being caused by the Actos he was taking.  He did stop it approxi-4 days ago and he does think there might be an improvement already.  He was told medication adverse effects is a very good possibility and looking at his list of medications that will be one along with the hydrochlorothiazide he takes in the morning as well.  He was told if it does seem to resolve since stopping the Actos he could experiment and try restarting it to see if it recurs.  He was told we can consider medication adjustments for the diabetes afterwards.\par \par Diabetes–his A1c's have been well controlled but I was on both the Januvia and the Actos.  Since he might be stopping Actos he was told he may have to consider adding something else if needed.  He was also thinking that his the way he was feeling could be would due to his blood sugar and is given a prescription for a glucometer so he can check his fingersticks at home intermittently when needed.\par \par Hypertension–he takes lisinopril/hydrochlorothiazide in the a.m. and only lisinopril in the p.m.  He was told that the HCTZ could be a cause as well but will look into that after the Actos possibility is finished.\par \par Hyperlipidemia–his Crestor was recently increased from 10 to 20 mg but he has already tried having the dose back down to 10 and it did not seem to make a difference.  For now is going to resume 20 mg daily.\par \par A total of 30 min was spent in this encounter.

## 2021-10-28 NOTE — REVIEW OF SYSTEMS
[Headache] : headache [Fever] : no fever [Chills] : no chills [Vision Problems] : no vision problems [Chest Pain] : no chest pain [Palpitations] : no palpitations [Shortness Of Breath] : no shortness of breath [Dizziness] : no dizziness [Fainting] : no fainting

## 2022-02-01 ENCOUNTER — RX RENEWAL (OUTPATIENT)
Age: 77
End: 2022-02-01

## 2022-02-11 ENCOUNTER — TRANSCRIPTION ENCOUNTER (OUTPATIENT)
Age: 77
End: 2022-02-11

## 2022-03-02 ENCOUNTER — APPOINTMENT (OUTPATIENT)
Dept: INTERNAL MEDICINE | Facility: CLINIC | Age: 77
End: 2022-03-02
Payer: MEDICARE

## 2022-03-02 VITALS
WEIGHT: 205 LBS | DIASTOLIC BLOOD PRESSURE: 70 MMHG | BODY MASS INDEX: 29.35 KG/M2 | SYSTOLIC BLOOD PRESSURE: 130 MMHG | HEIGHT: 70 IN

## 2022-03-02 DIAGNOSIS — N52.9 MALE ERECTILE DYSFUNCTION, UNSPECIFIED: ICD-10-CM

## 2022-03-02 DIAGNOSIS — J30.9 ALLERGIC RHINITIS, UNSPECIFIED: ICD-10-CM

## 2022-03-02 PROCEDURE — 36415 COLL VENOUS BLD VENIPUNCTURE: CPT

## 2022-03-02 PROCEDURE — 99214 OFFICE O/P EST MOD 30 MIN: CPT | Mod: 25

## 2022-03-02 NOTE — ASSESSMENT
[FreeTextEntry1] : Diabetes–follow-up A1c is sent.  For now she will remain on Januvia 100 mg.  He was not able to tolerate the Actos because it did cause recurrent dizziness.  He was told if it remains elevated may consider adding Amaryl 1 mg.  \par \par Headache/allergic rhinitis–he recently completed a course of antibiotics for suspected sinus infection.  Some of his headache symptoms did improve.  He had seen ENT in the past for similar reasons and his evaluation was unremarkable.  He was told allergies are very good possibility and he has been taking Claritin for years.  He was encouraged to try a different agent..  He will try Zyrtec 10 mg in the evening to see if it helps with the symptoms and he might also help him with his sleep.\par \par Fatigue–his thyroid will be checked.  He does seem to notice he is more tired during the day when he does not get a good night sleep.  In the past he had used melatonin with improvement and was encouraged to continue to use it if needed.  He had used NyQuil in the past and he was told if that is what works best for him that is acceptable as well as long as it does not cause any side effects which he states it does not.  Hyperlipidemia–follow-up lipid profile is sent.  His cholesterol has been well controlled and his last reading was 107.  For now he is going to remain on Lipitor 20 mg daily.\par \par Hypertension–his blood pressure is controlled and he follows regularly with his cardiologist and for now will remain on lisinopril HCT in the a.m. and lisinopril in the p.m.\par \par Erectile dysfunction–he was asking about taking Cialis 10 mg on a daily basis which he was told he could do or alternatively he could try it every other day regimen.  The idea of trying a different agent like sildenafil was discussed but he wants to stay with the Cialis for now.\par

## 2022-03-03 LAB
ALBUMIN SERPL ELPH-MCNC: 4.6 G/DL
ALP BLD-CCNC: 61 U/L
ALT SERPL-CCNC: 17 U/L
ANION GAP SERPL CALC-SCNC: 14 MMOL/L
AST SERPL-CCNC: 21 U/L
BILIRUB SERPL-MCNC: 1.1 MG/DL
BUN SERPL-MCNC: 16 MG/DL
CALCIUM SERPL-MCNC: 9.5 MG/DL
CHLORIDE SERPL-SCNC: 100 MMOL/L
CHOLEST SERPL-MCNC: 119 MG/DL
CO2 SERPL-SCNC: 22 MMOL/L
CREAT SERPL-MCNC: 1.3 MG/DL
EGFR: 58 ML/MIN/1.73M2
ESTIMATED AVERAGE GLUCOSE: 151 MG/DL
GLUCOSE SERPL-MCNC: 127 MG/DL
HBA1C MFR BLD HPLC: 6.9 %
HDLC SERPL-MCNC: 46 MG/DL
LDLC SERPL CALC-MCNC: 52 MG/DL
NONHDLC SERPL-MCNC: 73 MG/DL
POTASSIUM SERPL-SCNC: 4.4 MMOL/L
PROT SERPL-MCNC: 7.5 G/DL
SODIUM SERPL-SCNC: 136 MMOL/L
T4 SERPL-MCNC: 6.5 UG/DL
TRIGL SERPL-MCNC: 104 MG/DL
TSH SERPL-ACNC: 1.46 UIU/ML

## 2022-03-04 RX ORDER — PIOGLITAZONE HYDROCHLORIDE 15 MG/1
15 TABLET ORAL DAILY
Qty: 90 | Refills: 1 | Status: DISCONTINUED | COMMUNITY
Start: 2021-04-26 | End: 2022-03-04

## 2022-03-05 ENCOUNTER — RX RENEWAL (OUTPATIENT)
Age: 77
End: 2022-03-05

## 2022-06-08 ENCOUNTER — RX RENEWAL (OUTPATIENT)
Age: 77
End: 2022-06-08

## 2022-06-27 RX ORDER — LORATADINE 10 MG/1
10 TABLET ORAL
Refills: 0 | Status: ACTIVE | COMMUNITY

## 2022-09-12 ENCOUNTER — NON-APPOINTMENT (OUTPATIENT)
Age: 77
End: 2022-09-12

## 2022-09-12 ENCOUNTER — APPOINTMENT (OUTPATIENT)
Dept: INTERNAL MEDICINE | Facility: CLINIC | Age: 77
End: 2022-09-12

## 2022-09-12 VITALS — BODY MASS INDEX: 29.92 KG/M2 | HEIGHT: 70 IN | WEIGHT: 209 LBS

## 2022-09-12 VITALS — DIASTOLIC BLOOD PRESSURE: 70 MMHG | SYSTOLIC BLOOD PRESSURE: 130 MMHG

## 2022-09-12 PROCEDURE — G0439: CPT

## 2022-09-12 PROCEDURE — 36415 COLL VENOUS BLD VENIPUNCTURE: CPT | Mod: 59

## 2022-09-12 NOTE — ASSESSMENT
[FreeTextEntry1] : His exam is unremarkable/unchanged.\par \par Diabetes–follow-up A1c is sent.  Most recent reading from 3/22 was 6.9.  For now is remain on Januvia 100 mg daily.\par \par CAD/hypertension–his blood pressure remains well controlled and he is asymptomatic.  He does follow with his cardiologist regularly.  For now is going to remain on lisinopril HCT 20/12 point 5 in the AM and lisinopril 10 mg in the p.m.\par \par Hyperlipidemia–follow-up lipid profile was sent.  We discussed the possibility of the Crestor causing muscle fatigue and the idea of trying to stop it for 4 to 6 weeks to see if there is any change in his symptoms.\par \par Follow-up PSA was sent for prostate cancer screening.  He is asymptomatic and they have been normal in the past.\par \par Last colonoscopy was in 2/16 and it was a normal exam.

## 2022-09-12 NOTE — HISTORY OF PRESENT ILLNESS
sodas that contain caffeine). Try to avoid alcohol, coffee, and other beverages that you associate with cigarette smoking. Strike up conversation instead of a match for a cigarette. If you miss the sensation of having a cigarette in your hand, play with something elsea pencil, a paper clip, a marble. If you miss having something in your mouth, try toothpicks or a fake cigarette. Here are some useful phone numbers and resources for you to help your smoking cessation. [address] [place] [address]: [phone]  Smoking cessation programs in [facility]: [phone]  \"Freshstart\" a 4-session program for smoking cessation - group sessions    [facility] Use Prevention and Control [facility]: 1800-QUIT-NOW     [facility]: [phone]  \"Freshstart' - 4-session program for smoking cessation - group sessions    [facility]: [phone]  Personal Smoking cessation consultations - teens and adults  [place] appointment $606    [facility]: [phone]  Offers individual hypnosis, behavior modifications, and counseling in this program. Three sessions over 2-week period  $155 (total cost)    Nicotine Anonymous: [phone]  Open group cessation - everyone welcome     American Cancer Society: [phone]    American Lung Association: 1-800-LUNG-[place]    On-line help:  www.cancer. org  www.quitnet. org  www. whyquit. com  www.stopsmokingsuppport. com  www. ffsonline. org [de-identified] : 77 y/o male presents for annual Medicare wellness visit, follow-up fasting labs and prescription renewals.\par History of diabetes, hypertension and hyperlipidemia.\par He follows regularly with his cardiologist.  He does complain of some malaise fatigue as well as achiness in his legs.  He is presently on Crestor 20 mg daily.\par He has been generally well otherwise without any recent illness.\par Last colonoscopy was in 2/16 and that was a normal exam.

## 2022-09-12 NOTE — HEALTH RISK ASSESSMENT
[No falls in past year] : Patient reported no falls in the past year [0] : 2) Feeling down, depressed, or hopeless: Not at all (0) [PHQ-2 Negative - No further assessment needed] : PHQ-2 Negative - No further assessment needed [Never] : Never [No] : In the past 12 months have you used drugs other than those required for medical reasons? No [Patient reported colonoscopy was normal] : Patient reported colonoscopy was normal [[id number]] : 0 [ColonoscopyDate] : 02/16

## 2022-09-12 NOTE — PHYSICAL EXAM
[No Acute Distress] : no acute distress [No JVD] : no jugular venous distention [No Lymphadenopathy] : no lymphadenopathy [Clear to Auscultation] : lungs were clear to auscultation bilaterally [Normal Rate] : normal rate  [Regular Rhythm] : with a regular rhythm [No Edema] : there was no peripheral edema [Grossly Normal Strength/Tone] : grossly normal strength/tone [No Focal Deficits] : no focal deficits [Alert and Oriented x3] : oriented to person, place, and time

## 2022-09-12 NOTE — REVIEW OF SYSTEMS
[Muscle Pain] : muscle pain [Muscle Weakness] : muscle weakness [Fever] : no fever [Chills] : no chills [Vision Problems] : no vision problems [Chest Pain] : no chest pain [Palpitations] : no palpitations [Shortness Of Breath] : no shortness of breath [Dyspnea on Exertion] : not dyspnea on exertion [Abdominal Pain] : no abdominal pain [Headache] : no headache [Dizziness] : no dizziness [Fainting] : no fainting

## 2022-09-13 LAB
ALBUMIN SERPL ELPH-MCNC: 4.9 G/DL
ALP BLD-CCNC: 61 U/L
ALT SERPL-CCNC: 25 U/L
ANION GAP SERPL CALC-SCNC: 12 MMOL/L
AST SERPL-CCNC: 25 U/L
BASOPHILS # BLD AUTO: 0.04 K/UL
BASOPHILS NFR BLD AUTO: 0.8 %
BILIRUB SERPL-MCNC: 0.9 MG/DL
BUN SERPL-MCNC: 14 MG/DL
CALCIUM SERPL-MCNC: 9.9 MG/DL
CHLORIDE SERPL-SCNC: 101 MMOL/L
CHOLEST SERPL-MCNC: 123 MG/DL
CO2 SERPL-SCNC: 25 MMOL/L
CREAT SERPL-MCNC: 1.25 MG/DL
EGFR: 60 ML/MIN/1.73M2
EOSINOPHIL # BLD AUTO: 0.17 K/UL
EOSINOPHIL NFR BLD AUTO: 3.3 %
ESTIMATED AVERAGE GLUCOSE: 154 MG/DL
GLUCOSE SERPL-MCNC: 138 MG/DL
HBA1C MFR BLD HPLC: 7 %
HCT VFR BLD CALC: 46 %
HDLC SERPL-MCNC: 50 MG/DL
HGB BLD-MCNC: 15.2 G/DL
IMM GRANULOCYTES NFR BLD AUTO: 0.6 %
LDLC SERPL CALC-MCNC: 46 MG/DL
LYMPHOCYTES # BLD AUTO: 1.51 K/UL
LYMPHOCYTES NFR BLD AUTO: 29.4 %
MAN DIFF?: NORMAL
MCHC RBC-ENTMCNC: 30.1 PG
MCHC RBC-ENTMCNC: 33 GM/DL
MCV RBC AUTO: 91.1 FL
MONOCYTES # BLD AUTO: 0.47 K/UL
MONOCYTES NFR BLD AUTO: 9.1 %
NEUTROPHILS # BLD AUTO: 2.92 K/UL
NEUTROPHILS NFR BLD AUTO: 56.8 %
NONHDLC SERPL-MCNC: 74 MG/DL
PLATELET # BLD AUTO: 184 K/UL
POTASSIUM SERPL-SCNC: 4.8 MMOL/L
PROT SERPL-MCNC: 7.3 G/DL
PSA SERPL-MCNC: 1.92 NG/ML
RBC # BLD: 5.05 M/UL
RBC # FLD: 12.7 %
SODIUM SERPL-SCNC: 138 MMOL/L
T4 SERPL-MCNC: 6.4 UG/DL
TRIGL SERPL-MCNC: 139 MG/DL
TSH SERPL-ACNC: 1.82 UIU/ML
WBC # FLD AUTO: 5.14 K/UL

## 2022-11-02 ENCOUNTER — APPOINTMENT (OUTPATIENT)
Dept: CARDIOLOGY | Facility: CLINIC | Age: 77
End: 2022-11-02

## 2022-12-06 ENCOUNTER — NON-APPOINTMENT (OUTPATIENT)
Age: 77
End: 2022-12-06

## 2022-12-07 ENCOUNTER — RX RENEWAL (OUTPATIENT)
Age: 77
End: 2022-12-07

## 2022-12-19 ENCOUNTER — RESULT CHARGE (OUTPATIENT)
Age: 77
End: 2022-12-19

## 2022-12-20 ENCOUNTER — NON-APPOINTMENT (OUTPATIENT)
Age: 77
End: 2022-12-20

## 2022-12-20 ENCOUNTER — APPOINTMENT (OUTPATIENT)
Dept: CARDIOLOGY | Facility: CLINIC | Age: 77
End: 2022-12-20

## 2022-12-20 VITALS
DIASTOLIC BLOOD PRESSURE: 64 MMHG | HEIGHT: 70 IN | SYSTOLIC BLOOD PRESSURE: 136 MMHG | BODY MASS INDEX: 29.92 KG/M2 | HEART RATE: 74 BPM | TEMPERATURE: 98.6 F | WEIGHT: 209 LBS | OXYGEN SATURATION: 96 %

## 2022-12-20 DIAGNOSIS — R94.31 ABNORMAL ELECTROCARDIOGRAM [ECG] [EKG]: ICD-10-CM

## 2022-12-20 PROCEDURE — 93000 ELECTROCARDIOGRAM COMPLETE: CPT

## 2022-12-20 PROCEDURE — 99214 OFFICE O/P EST MOD 30 MIN: CPT

## 2022-12-20 RX ORDER — ROSUVASTATIN CALCIUM 20 MG/1
20 TABLET, FILM COATED ORAL
Qty: 90 | Refills: 1 | Status: DISCONTINUED | COMMUNITY
Start: 2021-01-12 | End: 2022-12-20

## 2022-12-20 NOTE — ASSESSMENT
[FreeTextEntry1] : I discussed indication for crossover to an angiogram guided strategy if high risk features including symptoms, LV dysfunction or significant ischemia.  \par Continue aspirin.  \par Restart statin.  If adverse effect instructed to notify our office.  Discussed novel nonstatin lipid-lowering therapies.  \par Follow-up echocardiogram and stress test.  \par Discussed importance of completion of medical evaluation.\par \par CTA severe cac non obsx \par ASA, statin, glucose management. \par Discussed low threshold if any symptoms or significant ischemia on imaging advised invasive angiography. \par \par history of aortic atherosclerosis carotid atherosclerosis \par ASA, statin as above. \par Surveillance monitoring \par \par HLD\par crestor \par \par Mild valvular heart disease\par Surveillance monitoring advised. \par No SBE prophylaxis required. \par \par Multiple coronary risk factors, including diabetes, hypertension, family history CVD\par Continue aspirin therapy. \par Continue antihypertensive. \par Continue statin therapy. \par Glucose management with primary physician.\par follow-up with primary physician regarding management of diabetes favor use of glp/SGLT2 inhibitor \par emphasized importance of lipid and glucose control

## 2022-12-20 NOTE — HISTORY OF PRESENT ILLNESS
[FreeTextEntry1] : TANA MCKEON  is a 77 year old  M\par \par \par History of hypertension, diabetes, family history of cardiovascular disease, atherosclerosis, mild VHD\par \par There is no prior history of a clinical myocardial infarction, coronary revascularization. \par There is no history of symptomatic congestive heart failure rheumatic heart disease\par There is no history of symptomatic arrhythmias including atrial fibrillation.\par \par There are symptoms of fatigue.  He has been less active.  His wife notes he is more winded with activity.  He stopped his cholesterol medication.  \par Previously recommended stress test was never completed. \par \par There is no exertional chest pain, pressure or discomfort. \par There is no orthopnea. \par There are no symptomatic palpitations r syncope.\par \par Last A1c 7.0.  Last creatinine 1.3.  Last LDL 46. \par  \par Today's EKG demonstrates normal sinus rhythm with a prior inferior infarct. \par \par CTA 8/2/21, Calcium score 847 limited\par LAD and 1st diag <50% stenosis\par Ramus <40%\par FFR shows no hemodynamically significant stenosis of the LAD\par  \[place]. \par Significant family history of carotid disease and father had initial cardiac event in his 50s.\par \par Carotid Doppler 2/19 demonstrated mild atherosclerosis. \par Echocardiogram 2/19 demonstrated normal left ventricular function with mild valvular heart disease and borderline aortic dilatation. \par Abdominal ultrasound 2/19 demonstrates aortic atherosclerosis. No aneurysm.\par

## 2023-01-05 ENCOUNTER — APPOINTMENT (OUTPATIENT)
Dept: CARDIOLOGY | Facility: CLINIC | Age: 78
End: 2023-01-05

## 2023-01-18 ENCOUNTER — RX RENEWAL (OUTPATIENT)
Age: 78
End: 2023-01-18

## 2023-01-24 ENCOUNTER — APPOINTMENT (OUTPATIENT)
Dept: CARDIOLOGY | Facility: CLINIC | Age: 78
End: 2023-01-24
Payer: MEDICARE

## 2023-01-24 ENCOUNTER — APPOINTMENT (OUTPATIENT)
Dept: CARDIOLOGY | Facility: CLINIC | Age: 78
End: 2023-01-24

## 2023-01-24 PROCEDURE — A9502: CPT

## 2023-01-24 PROCEDURE — 93306 TTE W/DOPPLER COMPLETE: CPT

## 2023-01-24 PROCEDURE — 93015 CV STRESS TEST SUPVJ I&R: CPT

## 2023-01-24 PROCEDURE — 78452 HT MUSCLE IMAGE SPECT MULT: CPT

## 2023-02-07 ENCOUNTER — APPOINTMENT (OUTPATIENT)
Dept: CARDIOLOGY | Facility: CLINIC | Age: 78
End: 2023-02-07
Payer: MEDICARE

## 2023-02-07 VITALS
BODY MASS INDEX: 29.78 KG/M2 | HEART RATE: 71 BPM | WEIGHT: 208 LBS | TEMPERATURE: 98.8 F | DIASTOLIC BLOOD PRESSURE: 68 MMHG | HEIGHT: 70 IN | SYSTOLIC BLOOD PRESSURE: 136 MMHG | OXYGEN SATURATION: 98 %

## 2023-02-07 DIAGNOSIS — R06.09 OTHER FORMS OF DYSPNEA: ICD-10-CM

## 2023-02-07 PROCEDURE — 99214 OFFICE O/P EST MOD 30 MIN: CPT

## 2023-02-07 RX ORDER — METOPROLOL SUCCINATE 25 MG/1
25 TABLET, EXTENDED RELEASE ORAL AT BEDTIME
Qty: 90 | Refills: 3 | Status: ACTIVE | COMMUNITY
Start: 2023-02-07 | End: 1900-01-01

## 2023-02-07 NOTE — ASSESSMENT
[FreeTextEntry1] : TANA MCKEON is a 77 year old M who presents today Feb 07, 2023 with the above history and the following active issues: \par \par CTA severe CAC non obsx \par No angina, normal EF\par normal myocardial perfusion, no evidence of infarction or inducible ischemia \par ASA, statin, glucose management. \par Advised to call right away for further eval if any exertional sx occur.\par \par NSVT, PAT in immediate recovery on ETT\par event monitor results are pending\par no relative sx\par given concomitant CAD and HTN in office past several visits will add toprol 25mg QHS\par \par history of aortic atherosclerosis carotid atherosclerosis \par ASA, statin as above. \par Surveillance monitoring \par \par HLD\par cont crestor \par labs w PCP\par \par Mild valvular heart disease\par Surveillance monitoring advised. \par No SBE prophylaxis required. \par \par HTN\par lightheadedness improved with splitting lisinopril dosing AM/PM\par cont current dosing\par add BB as above\par low salt diet \par \par Multiple coronary risk factors, including diabetes, hypertension, family history CVD\par Risk factor modification, med rx as above \par Glucose management with primary physician.\par follow-up with primary physician regarding management of diabetes favor use of glp/SGLT2 inhibitor \par pt wishes to keep DM management w PCP\par \par F/U one month to reeval BP/HR w med changes \par \par Sincerely,\par \par BRENDA Mckenan\par Patients history, testing, and plan reviewed with supervising MD: Dr. Bhavin Sanchez

## 2023-02-07 NOTE — ADDENDUM
[FreeTextEntry1] : Please note the patient was reviewed with NP Sussy Souza.\par I was physically present during the service of the patient.\par I was directly involved in the management plan and recommendations of the care provided to the patient. \par I personally reviewed the history and physical examination as documented by the NP above.\par \par

## 2023-02-14 ENCOUNTER — RX RENEWAL (OUTPATIENT)
Age: 78
End: 2023-02-14

## 2023-02-17 ENCOUNTER — RX RENEWAL (OUTPATIENT)
Age: 78
End: 2023-02-17

## 2023-03-08 ENCOUNTER — APPOINTMENT (OUTPATIENT)
Dept: CARDIOLOGY | Facility: CLINIC | Age: 78
End: 2023-03-08

## 2023-03-10 ENCOUNTER — RX RENEWAL (OUTPATIENT)
Age: 78
End: 2023-03-10

## 2023-03-10 RX ORDER — TADALAFIL 20 MG/1
20 TABLET ORAL
Qty: 10 | Refills: 2 | Status: ACTIVE | COMMUNITY
Start: 2020-12-03 | End: 1900-01-01

## 2023-03-14 ENCOUNTER — APPOINTMENT (OUTPATIENT)
Dept: INTERNAL MEDICINE | Facility: CLINIC | Age: 78
End: 2023-03-14
Payer: MEDICARE

## 2023-03-14 VITALS — DIASTOLIC BLOOD PRESSURE: 86 MMHG | SYSTOLIC BLOOD PRESSURE: 150 MMHG

## 2023-03-14 DIAGNOSIS — R51.9 HEADACHE, UNSPECIFIED: ICD-10-CM

## 2023-03-14 DIAGNOSIS — R42 DIZZINESS AND GIDDINESS: ICD-10-CM

## 2023-03-14 DIAGNOSIS — R53.83 OTHER FATIGUE: ICD-10-CM

## 2023-03-14 PROCEDURE — 36415 COLL VENOUS BLD VENIPUNCTURE: CPT | Mod: 59

## 2023-03-14 PROCEDURE — 99214 OFFICE O/P EST MOD 30 MIN: CPT | Mod: 25

## 2023-03-14 RX ORDER — AZELASTINE HYDROCHLORIDE 137 UG/1
137 SPRAY, METERED NASAL
Qty: 1 | Refills: 2 | Status: ACTIVE | COMMUNITY
Start: 2023-03-14 | End: 1900-01-01

## 2023-03-14 NOTE — REVIEW OF SYSTEMS
[Headache] : headache [Dizziness] : dizziness [Fever] : no fever [Chest Pain] : no chest pain [Palpitations] : no palpitations [Shortness Of Breath] : no shortness of breath

## 2023-03-14 NOTE — PHYSICAL EXAM
[No Acute Distress] : no acute distress [No Respiratory Distress] : no respiratory distress  [Normal Rate] : normal rate  [Regular Rhythm] : with a regular rhythm [No Edema] : there was no peripheral edema [No Focal Deficits] : no focal deficits

## 2023-03-14 NOTE — HISTORY OF PRESENT ILLNESS
[de-identified] : Presents for follow-up fasting labs and prescription renewals.\par He has a history of diabetes, hypertension and hyperlipidemia.\par Had recent follow-up with his cardiologist and his work-up did reveal some atrial and ventricular ectopy during his stress test.  He was asymptomatic but was started on metoprolol ER 25 mg daily.  He does have a history of intermittent lightheadedness and dizziness which seems to be more allergy related but does state that since starting metoprolol he may be somewhat worse.  He denies any chest pain, palpitations or shortness of breath.  No syncope.

## 2023-03-14 NOTE — ASSESSMENT
[FreeTextEntry1] : Diabetes–follow-up fasting labs and A1c was sent.  For now he will continue with Januvia 100 mg daily.  Most recent A1c from 9/22 was 7.0.\par \par CAD/hypertension/NSVT–his blood pressure is controlled and he is asymptomatic.  He does feel that some of his symptoms of fatigue and lightheadedness were worse since starting metoprolol.  He is going to give it more time.  He does have follow-up scheduled with his cardiologist in the near future.\par He will continue metoprolol ER 25 mg daily, lisinopril HCT 20/12.5 mg during the day and lisinopril 10 mg in the evening.\par \par Hyperlipidemia–follow-up lipid profile was sent in for now he will remain on Crestor 10 mg daily.\par \par History of headache/dizziness/allergic rhinitis–he is using Claritin 10 mg daily.  Also has been using Flonase.  He is going to try Astepro and see if it helps more.  His symptoms have come and gone for years and seem more allergy related.  They are no no associated neurological symptoms of concern.

## 2023-03-15 LAB
ALBUMIN SERPL ELPH-MCNC: 4.6 G/DL
ALP BLD-CCNC: 74 U/L
ALT SERPL-CCNC: 22 U/L
ANION GAP SERPL CALC-SCNC: 13 MMOL/L
AST SERPL-CCNC: 22 U/L
BASOPHILS # BLD AUTO: 0.04 K/UL
BASOPHILS NFR BLD AUTO: 0.7 %
BILIRUB SERPL-MCNC: 0.9 MG/DL
BUN SERPL-MCNC: 11 MG/DL
CALCIUM SERPL-MCNC: 9.8 MG/DL
CHLORIDE SERPL-SCNC: 103 MMOL/L
CHOLEST SERPL-MCNC: 164 MG/DL
CO2 SERPL-SCNC: 23 MMOL/L
CREAT SERPL-MCNC: 1.25 MG/DL
EGFR: 60 ML/MIN/1.73M2
EOSINOPHIL # BLD AUTO: 0.13 K/UL
EOSINOPHIL NFR BLD AUTO: 2.4 %
ESTIMATED AVERAGE GLUCOSE: 154 MG/DL
GLUCOSE SERPL-MCNC: 131 MG/DL
HBA1C MFR BLD HPLC: 7 %
HCT VFR BLD CALC: 46.5 %
HDLC SERPL-MCNC: 50 MG/DL
HGB BLD-MCNC: 15.2 G/DL
IMM GRANULOCYTES NFR BLD AUTO: 0.9 %
LDLC SERPL CALC-MCNC: 92 MG/DL
LYMPHOCYTES # BLD AUTO: 1.61 K/UL
LYMPHOCYTES NFR BLD AUTO: 29.3 %
MAN DIFF?: NORMAL
MCHC RBC-ENTMCNC: 30.2 PG
MCHC RBC-ENTMCNC: 32.7 GM/DL
MCV RBC AUTO: 92.3 FL
MONOCYTES # BLD AUTO: 0.49 K/UL
MONOCYTES NFR BLD AUTO: 8.9 %
NEUTROPHILS # BLD AUTO: 3.17 K/UL
NEUTROPHILS NFR BLD AUTO: 57.8 %
NONHDLC SERPL-MCNC: 114 MG/DL
PLATELET # BLD AUTO: 207 K/UL
POTASSIUM SERPL-SCNC: 4.5 MMOL/L
PROT SERPL-MCNC: 7.3 G/DL
RBC # BLD: 5.04 M/UL
RBC # FLD: 13.2 %
SODIUM SERPL-SCNC: 140 MMOL/L
TRIGL SERPL-MCNC: 112 MG/DL
WBC # FLD AUTO: 5.49 K/UL

## 2023-03-21 RX ORDER — LISINOPRIL AND HYDROCHLOROTHIAZIDE TABLETS 20; 12.5 MG/1; MG/1
20-12.5 TABLET ORAL
Qty: 90 | Refills: 3 | Status: ACTIVE | COMMUNITY
Start: 2019-02-14

## 2023-06-14 ENCOUNTER — RX RENEWAL (OUTPATIENT)
Age: 78
End: 2023-06-14

## 2023-06-14 ENCOUNTER — NON-APPOINTMENT (OUTPATIENT)
Age: 78
End: 2023-06-14

## 2023-06-14 ENCOUNTER — APPOINTMENT (OUTPATIENT)
Dept: OPHTHALMOLOGY | Facility: CLINIC | Age: 78
End: 2023-06-14
Payer: MEDICARE

## 2023-06-14 PROCEDURE — 92136 OPHTHALMIC BIOMETRY: CPT

## 2023-06-14 PROCEDURE — 99204 OFFICE O/P NEW MOD 45 MIN: CPT

## 2023-06-19 ENCOUNTER — APPOINTMENT (OUTPATIENT)
Dept: INTERNAL MEDICINE | Facility: CLINIC | Age: 78
End: 2023-06-19
Payer: MEDICARE

## 2023-06-19 ENCOUNTER — NON-APPOINTMENT (OUTPATIENT)
Age: 78
End: 2023-06-19

## 2023-06-19 VITALS
DIASTOLIC BLOOD PRESSURE: 84 MMHG | BODY MASS INDEX: 29.35 KG/M2 | SYSTOLIC BLOOD PRESSURE: 130 MMHG | HEIGHT: 70 IN | WEIGHT: 205 LBS

## 2023-06-19 DIAGNOSIS — H26.9 UNSPECIFIED CATARACT: ICD-10-CM

## 2023-06-19 DIAGNOSIS — Z01.818 ENCOUNTER FOR OTHER PREPROCEDURAL EXAMINATION: ICD-10-CM

## 2023-06-19 PROCEDURE — 99214 OFFICE O/P EST MOD 30 MIN: CPT | Mod: 25

## 2023-06-19 PROCEDURE — 93000 ELECTROCARDIOGRAM COMPLETE: CPT | Mod: 59

## 2023-06-19 NOTE — PHYSICAL EXAM
[No Acute Distress] : no acute distress [No JVD] : no jugular venous distention [Clear to Auscultation] : lungs were clear to auscultation bilaterally [Normal Rate] : normal rate  [Regular Rhythm] : with a regular rhythm [No Murmur] : no murmur heard [No Edema] : there was no peripheral edema [No Focal Deficits] : no focal deficits [de-identified] : Benign

## 2023-06-19 NOTE — HISTORY OF PRESENT ILLNESS
[de-identified] : 77-year-old male presents for medical evaluation prior to cataract surgery on 6/27/2023.\par He has a history of CAD/hypertension, diabetes and hyperlipidemia.\par Has been generally well without any recent illness.\par

## 2023-06-19 NOTE — REVIEW OF SYSTEMS
[Fever] : no fever [Chest Pain] : no chest pain [Shortness Of Breath] : no shortness of breath [Abdominal Pain] : no abdominal pain [Headache] : no headache [Dizziness] : no dizziness

## 2023-06-19 NOTE — ASSESSMENT
[FreeTextEntry1] : Cataract surgery–his exam is unremarkable.\par EKG reveals sinus rhythm with old IWMI.  No change.\par Had follow-up with cardiology in 2/23.\par No medical contraindications.\par \par History of CAD/hypertension–now remains on lisinopril 10 mg once daily and lisinopril HCT 20/12.5 daily.  He was started on metoprolol 25 mg but he stopped because it was more dizzy.  We did discuss the idea of stopping one of the lisinopril doses and restarting metoprolol.  He does have follow-up with his cardiologist scheduled in the next few weeks.\par \par Diabetes–most recent A1c from 3/23 was 7.0.  For now remains on Januvia 100 mg daily.  We also did discuss the idea of switching to either Farxiga or Jardiance because of additional benefits from a cardiac and renal standpoint.  He will consider it.\par \par Hyperlipidemia–his cholesterol remains well controlled and he will continue Crestor 10 mg daily.

## 2023-06-27 ENCOUNTER — APPOINTMENT (OUTPATIENT)
Dept: OPHTHALMOLOGY | Facility: HOSPITAL | Age: 78
End: 2023-06-27
Payer: MEDICARE

## 2023-06-27 ENCOUNTER — NON-APPOINTMENT (OUTPATIENT)
Age: 78
End: 2023-06-27

## 2023-06-27 PROCEDURE — CL050: CPT

## 2023-06-27 PROCEDURE — 66984 XCAPSL CTRC RMVL W/O ECP: CPT | Mod: RT

## 2023-06-28 ENCOUNTER — NON-APPOINTMENT (OUTPATIENT)
Age: 78
End: 2023-06-28

## 2023-06-28 ENCOUNTER — APPOINTMENT (OUTPATIENT)
Dept: OPHTHALMOLOGY | Facility: CLINIC | Age: 78
End: 2023-06-28
Payer: MEDICARE

## 2023-06-28 PROCEDURE — 99024 POSTOP FOLLOW-UP VISIT: CPT

## 2023-07-05 ENCOUNTER — APPOINTMENT (OUTPATIENT)
Dept: OPHTHALMOLOGY | Facility: CLINIC | Age: 78
End: 2023-07-05
Payer: MEDICARE

## 2023-07-05 ENCOUNTER — NON-APPOINTMENT (OUTPATIENT)
Age: 78
End: 2023-07-05

## 2023-07-05 PROCEDURE — 99213 OFFICE O/P EST LOW 20 MIN: CPT | Mod: 24

## 2023-07-05 PROCEDURE — 92136 OPHTHALMIC BIOMETRY: CPT | Mod: 26,LT

## 2023-07-11 ENCOUNTER — NON-APPOINTMENT (OUTPATIENT)
Age: 78
End: 2023-07-11

## 2023-07-11 ENCOUNTER — APPOINTMENT (OUTPATIENT)
Dept: OPHTHALMOLOGY | Facility: HOSPITAL | Age: 78
End: 2023-07-11
Payer: MEDICARE

## 2023-07-11 PROCEDURE — CL050: CPT

## 2023-07-11 PROCEDURE — 66984 XCAPSL CTRC RMVL W/O ECP: CPT | Mod: 79,LT

## 2023-07-12 ENCOUNTER — NON-APPOINTMENT (OUTPATIENT)
Age: 78
End: 2023-07-12

## 2023-07-12 ENCOUNTER — APPOINTMENT (OUTPATIENT)
Dept: OPHTHALMOLOGY | Facility: CLINIC | Age: 78
End: 2023-07-12
Payer: MEDICARE

## 2023-07-12 PROCEDURE — 99024 POSTOP FOLLOW-UP VISIT: CPT

## 2023-07-19 ENCOUNTER — APPOINTMENT (OUTPATIENT)
Dept: OPHTHALMOLOGY | Facility: CLINIC | Age: 78
End: 2023-07-19
Payer: MEDICARE

## 2023-07-19 ENCOUNTER — NON-APPOINTMENT (OUTPATIENT)
Age: 78
End: 2023-07-19

## 2023-07-19 PROCEDURE — 99024 POSTOP FOLLOW-UP VISIT: CPT

## 2023-07-26 ENCOUNTER — APPOINTMENT (OUTPATIENT)
Dept: OPHTHALMOLOGY | Facility: CLINIC | Age: 78
End: 2023-07-26

## 2023-08-02 ENCOUNTER — APPOINTMENT (OUTPATIENT)
Dept: OPHTHALMOLOGY | Facility: CLINIC | Age: 78
End: 2023-08-02
Payer: MEDICARE

## 2023-08-02 ENCOUNTER — NON-APPOINTMENT (OUTPATIENT)
Age: 78
End: 2023-08-02

## 2023-08-02 PROCEDURE — 99024 POSTOP FOLLOW-UP VISIT: CPT

## 2023-08-02 PROCEDURE — ZZZZZ: CPT

## 2023-08-09 ENCOUNTER — APPOINTMENT (OUTPATIENT)
Dept: OPHTHALMOLOGY | Facility: CLINIC | Age: 78
End: 2023-08-09
Payer: MEDICARE

## 2023-08-09 ENCOUNTER — NON-APPOINTMENT (OUTPATIENT)
Age: 78
End: 2023-08-09

## 2023-08-09 PROCEDURE — ZZZZZ: CPT

## 2023-08-09 PROCEDURE — 99024 POSTOP FOLLOW-UP VISIT: CPT

## 2023-08-16 ENCOUNTER — APPOINTMENT (OUTPATIENT)
Dept: OPHTHALMOLOGY | Facility: CLINIC | Age: 78
End: 2023-08-16
Payer: MEDICARE

## 2023-08-16 ENCOUNTER — NON-APPOINTMENT (OUTPATIENT)
Age: 78
End: 2023-08-16

## 2023-08-16 PROCEDURE — 99024 POSTOP FOLLOW-UP VISIT: CPT

## 2023-08-16 PROCEDURE — ZZZZZ: CPT

## 2023-08-23 ENCOUNTER — APPOINTMENT (OUTPATIENT)
Dept: OPHTHALMOLOGY | Facility: CLINIC | Age: 78
End: 2023-08-23
Payer: MEDICARE

## 2023-08-23 ENCOUNTER — NON-APPOINTMENT (OUTPATIENT)
Age: 78
End: 2023-08-23

## 2023-08-23 PROCEDURE — 99024 POSTOP FOLLOW-UP VISIT: CPT

## 2023-08-23 PROCEDURE — ZZZZZ: CPT

## 2023-08-30 ENCOUNTER — NON-APPOINTMENT (OUTPATIENT)
Age: 78
End: 2023-08-30

## 2023-08-30 ENCOUNTER — APPOINTMENT (OUTPATIENT)
Dept: OPHTHALMOLOGY | Facility: CLINIC | Age: 78
End: 2023-08-30
Payer: MEDICARE

## 2023-08-30 PROCEDURE — 99024 POSTOP FOLLOW-UP VISIT: CPT

## 2023-08-30 PROCEDURE — ZZZZZ: CPT

## 2023-10-16 ENCOUNTER — NON-APPOINTMENT (OUTPATIENT)
Age: 78
End: 2023-10-16

## 2023-10-16 ENCOUNTER — APPOINTMENT (OUTPATIENT)
Dept: INTERNAL MEDICINE | Facility: CLINIC | Age: 78
End: 2023-10-16
Payer: MEDICARE

## 2023-10-16 VITALS
DIASTOLIC BLOOD PRESSURE: 82 MMHG | BODY MASS INDEX: 29.49 KG/M2 | HEIGHT: 70 IN | WEIGHT: 206 LBS | SYSTOLIC BLOOD PRESSURE: 128 MMHG

## 2023-10-16 DIAGNOSIS — Z00.00 ENCOUNTER FOR GENERAL ADULT MEDICAL EXAMINATION W/OUT ABNORMAL FINDINGS: ICD-10-CM

## 2023-10-16 DIAGNOSIS — Z12.5 ENCOUNTER FOR SCREENING FOR MALIGNANT NEOPLASM OF PROSTATE: ICD-10-CM

## 2023-10-16 PROCEDURE — 36415 COLL VENOUS BLD VENIPUNCTURE: CPT

## 2023-10-16 PROCEDURE — G0439: CPT

## 2023-10-17 LAB
ALBUMIN SERPL ELPH-MCNC: 4.8 G/DL
ALP BLD-CCNC: 81 U/L
ALT SERPL-CCNC: 23 U/L
ANION GAP SERPL CALC-SCNC: 15 MMOL/L
AST SERPL-CCNC: 25 U/L
BASOPHILS # BLD AUTO: 0.06 K/UL
BASOPHILS NFR BLD AUTO: 0.9 %
BILIRUB SERPL-MCNC: 1 MG/DL
BUN SERPL-MCNC: 9 MG/DL
CALCIUM SERPL-MCNC: 10.2 MG/DL
CHLORIDE SERPL-SCNC: 99 MMOL/L
CHOLEST SERPL-MCNC: 169 MG/DL
CO2 SERPL-SCNC: 24 MMOL/L
CREAT SERPL-MCNC: 1.32 MG/DL
EGFR: 56 ML/MIN/1.73M2
EOSINOPHIL # BLD AUTO: 0.11 K/UL
EOSINOPHIL NFR BLD AUTO: 1.6 %
ESTIMATED AVERAGE GLUCOSE: 163 MG/DL
GLUCOSE SERPL-MCNC: 158 MG/DL
HBA1C MFR BLD HPLC: 7.3 %
HCT VFR BLD CALC: 49.8 %
HDLC SERPL-MCNC: 53 MG/DL
HGB BLD-MCNC: 16.5 G/DL
IMM GRANULOCYTES NFR BLD AUTO: 0.6 %
LDLC SERPL CALC-MCNC: 88 MG/DL
LYMPHOCYTES # BLD AUTO: 2.04 K/UL
LYMPHOCYTES NFR BLD AUTO: 29.5 %
MAN DIFF?: NORMAL
MCHC RBC-ENTMCNC: 30.2 PG
MCHC RBC-ENTMCNC: 33.1 GM/DL
MCV RBC AUTO: 91.2 FL
MONOCYTES # BLD AUTO: 0.66 K/UL
MONOCYTES NFR BLD AUTO: 9.6 %
NEUTROPHILS # BLD AUTO: 4 K/UL
NEUTROPHILS NFR BLD AUTO: 57.8 %
NONHDLC SERPL-MCNC: 116 MG/DL
PLATELET # BLD AUTO: 225 K/UL
POTASSIUM SERPL-SCNC: 4.3 MMOL/L
PROT SERPL-MCNC: 7.5 G/DL
PSA SERPL-MCNC: 1.71 NG/ML
RBC # BLD: 5.46 M/UL
RBC # FLD: 13.1 %
SODIUM SERPL-SCNC: 138 MMOL/L
TRIGL SERPL-MCNC: 163 MG/DL
WBC # FLD AUTO: 6.91 K/UL

## 2023-11-16 ENCOUNTER — RX RENEWAL (OUTPATIENT)
Age: 78
End: 2023-11-16

## 2023-11-16 RX ORDER — CLOTRIMAZOLE AND BETAMETHASONE DIPROPIONATE 10; .5 MG/G; MG/G
1-0.05 CREAM TOPICAL
Qty: 45 | Refills: 0 | Status: ACTIVE | COMMUNITY
Start: 2021-08-24 | End: 1900-01-01

## 2023-11-30 ENCOUNTER — APPOINTMENT (OUTPATIENT)
Dept: CARDIOLOGY | Facility: CLINIC | Age: 78
End: 2023-11-30
Payer: MEDICARE

## 2023-11-30 PROCEDURE — 93242 EXT ECG>48HR<7D RECORDING: CPT

## 2023-12-08 ENCOUNTER — RX RENEWAL (OUTPATIENT)
Age: 78
End: 2023-12-08

## 2023-12-08 RX ORDER — SITAGLIPTIN 100 MG/1
100 TABLET, FILM COATED ORAL DAILY
Qty: 30 | Refills: 5 | Status: ACTIVE | COMMUNITY
Start: 2022-02-01 | End: 1900-01-01

## 2023-12-14 PROCEDURE — 93244 EXT ECG>48HR<7D REV&INTERPJ: CPT

## 2023-12-21 ENCOUNTER — APPOINTMENT (OUTPATIENT)
Dept: OPHTHALMOLOGY | Facility: CLINIC | Age: 78
End: 2023-12-21
Payer: MEDICARE

## 2023-12-21 ENCOUNTER — NON-APPOINTMENT (OUTPATIENT)
Age: 78
End: 2023-12-21

## 2023-12-21 PROCEDURE — 92014 COMPRE OPH EXAM EST PT 1/>: CPT

## 2024-01-03 NOTE — HISTORY OF PRESENT ILLNESS
Patient Instructions      Expand All Collapse All    Thank you for attending the [facility] appointment today.     1. Pain:  - change belbuca to taking 600mcg film in the morning and the 450mcg film in the evening  - continue taking morphine 15-30mg as needed    Call us at least 3-5 workdays in advance if you need a medication refill.    Return to clinic in about 2 months for a follow up.     You can reach the Palliative Care Team during business hours at the following number:    - [phone]  - or send me a [employer] message    To reach the Palliative Care Provider on-call After-hours or on holidays and weekends, call: [phone].  Please note that we are not able to provide pain medication refills on evenings or weekends.            [FreeTextEntry1] : TANA MCKEON  is a 77 year old  M\par \par History of hypertension, diabetes, family history of cardiovascular disease, atherosclerosis, mild VHD, nonobx CAD\par \par There is no prior history of a clinical myocardial infarction, coronary revascularization. \par There is no history of symptomatic congestive heart failure rheumatic heart disease\par There is no history of symptomatic arrhythmias including atrial fibrillation.\par \par There are symptoms of fatigue.  He has been less active. \par He adjusted his BP meds, split lisinopril dosing morning/night which has helped. \par He is back on statin. \par \par There is no exertional chest pain, pressure or discomfort. \par There is no orthopnea. \par There are no symptomatic palpitations syncope.\par \par He plans to see PCP to redraw labs and discuss DMII management including use of ozempic. \par \par Last A1c 7.0.  Last creatinine 1.3.  Last LDL 46. \par EKG demonstrates normal sinus rhythm with a prior inferior infarct.\par Echo 1/2023 LVEF 55-60% trace MR, aortic root 3.7cm , mild aortic sclerosis  \par NST 1/2023 4m 30s exercise no ischemia by EKG. PAT and NSVT in immediate recovery.  MPI normal myocardial perfusion, no evidence of infarction or inducible ischemia \par \par Carotid Doppler 2/19 demonstrated mild atherosclerosis. \par Abdominal ultrasound 2/19 demonstrates aortic atherosclerosis. No aneurysm.\par \par CTA 8/2/21, Calcium score 847 limited\par LAD and 1st diag <50% stenosis\par Ramus <40%\par FFR shows no hemodynamically significant stenosis of the LAD\par  \[place] [employer] industry. \par Significant family history of carotid disease and father had initial cardiac event in his 50s.\par  Posterior Auricular Interpolation Flap Division And Inset Text: Division and inset of the posterior auricular interpolation flap was performed to achieve optimal aesthetic result, restore normal anatomic appearance and avoid distortion of normal anatomy, expedite and facilitate wound healing, achieve optimal functional result and because linear closure either not possible or would produce suboptimal result. The patient was prepped and draped in the usual manner. The pedicle was infiltrated with local anesthesia. The pedicle was sectioned with a #15 blade. The pedicle was de-bulked and trimmed to match the shape of the defect. Hemostasis was achieved. The flap donor site and free margin of the flap were secured with deep buried sutures and the wound edges were re-approximated.

## 2024-01-08 ENCOUNTER — APPOINTMENT (OUTPATIENT)
Dept: CARDIOLOGY | Facility: CLINIC | Age: 79
End: 2024-01-08
Payer: MEDICARE

## 2024-01-08 VITALS
HEIGHT: 70 IN | HEART RATE: 60 BPM | WEIGHT: 200 LBS | DIASTOLIC BLOOD PRESSURE: 66 MMHG | OXYGEN SATURATION: 96 % | BODY MASS INDEX: 28.63 KG/M2 | SYSTOLIC BLOOD PRESSURE: 120 MMHG

## 2024-01-08 DIAGNOSIS — Z71.89 OTHER SPECIFIED COUNSELING: ICD-10-CM

## 2024-01-08 DIAGNOSIS — I25.10 ATHEROSCLEROTIC HEART DISEASE OF NATIVE CORONARY ARTERY W/OUT ANGINA PECTORIS: ICD-10-CM

## 2024-01-08 DIAGNOSIS — Z82.49 FAMILY HISTORY OF ISCHEMIC HEART DISEASE AND OTHER DISEASES OF THE CIRCULATORY SYSTEM: ICD-10-CM

## 2024-01-08 DIAGNOSIS — I47.29 OTHER VENTRICULAR TACHYCARDIA: ICD-10-CM

## 2024-01-08 PROCEDURE — 99214 OFFICE O/P EST MOD 30 MIN: CPT

## 2024-01-10 PROBLEM — I47.29 NSVT (NONSUSTAINED VENTRICULAR TACHYCARDIA): Status: ACTIVE | Noted: 2023-01-24

## 2024-01-10 PROBLEM — I25.10 CAD, MULTIPLE VESSEL: Status: ACTIVE | Noted: 2021-08-17

## 2024-01-10 PROBLEM — Z82.49 FAMILY HISTORY OF ASCVD: Status: ACTIVE | Noted: 2019-02-12

## 2024-01-10 PROBLEM — Z71.89 ENCOUNTER FOR CARDIAC RISK COUNSELING: Status: ACTIVE | Noted: 2022-12-20

## 2024-01-10 RX ORDER — ROSUVASTATIN CALCIUM 20 MG/1
20 TABLET, FILM COATED ORAL
Qty: 90 | Refills: 1 | Status: ACTIVE | COMMUNITY
Start: 2022-12-20 | End: 1900-01-01

## 2024-01-10 NOTE — HISTORY OF PRESENT ILLNESS
[FreeTextEntry1] : TANA MCKEON  is a 78 year old  M Last seen February 2023.  Interim blood work.  March 2023 LDL 92, A1c 7.0.  Last EKG demonstrates sinus rhythm with a leftward axis.  Last visit a monitor was recommended.  His major difficulty is with his eyes.  He follows with ophthalmology.  He underwent prior surgery.  Initially there was clinical improvement.  He also reports feeling more fatigued.  I have reduced his dose of metoprolol.  Discussed the importance of risk factor modification for cardiovascular event reduction.  Last blood work October 2023 triglycerides 163 LDL 88.  Restart statin therapy.  Increased dose of Crestor.  Last A1c 7.3.  There was technical difficulties with the monitor.  A repeat monitor was performed.  He reports feeling more fatigued and sluggish.  I have reduced his dose of metoprolol.  On recent monitor his average heart rate was 59 with benign premature beats and no significant ectopy.  He had a mechanical fall.  Continue aspirin, high intensity statin and antihypertensive therapy.  History of hypertension, diabetes, family history of cardiovascular disease, atherosclerosis, mild VHD, nonobx CAD  There is no prior history of a clinical myocardial infarction, coronary revascularization.  There is no history of symptomatic congestive heart failure rheumatic heart disease There is no history of symptomatic arrhythmias including atrial fibrillation.  There are symptoms of fatigue.  He has been less active.  He adjusted his BP meds, split lisinopril dosing morning/night which has helped.  been off statin?  There is no exertional chest pain, pressure or discomfort.  There is no orthopnea.  There are no symptomatic palpitations syncope.  Echo 1/2023 LVEF 55-60% trace MR, aortic root 3.7cm , mild aortic sclerosis   NST 1/2023 4m 30s exercise no ischemia by EKG. PAT and NSVT in immediate recovery.  MPI normal myocardial perfusion, no evidence of infarction or inducible ischemia  Carotid Doppler 2/19 demonstrated mild atherosclerosis.  Abdominal ultrasound 2/19 demonstrates aortic atherosclerosis. No aneurysm.  CTA 8/2/21, Calcium score 847 limited LAD and 1st diag <50% stenosis Ramus <40% FFR shows no hemodynamically significant stenosis of the LAD   [place] industry.  Significant family history of carotid disease and father had initial cardiac event in his 50s.  CTA severe CAC non obsx No angina, normal EF normal myocardial perfusion, no evidence of infarction or inducible ischemia ASA, statin, glucose management. Advised to call right away for further eval if any exertional sx occur.  NSVT, PAT in immediate recovery on ETT event monitor results are pending no relative sx given concomitant CAD and HTN in office past several visits will add toprol 25mg QHS  history of aortic atherosclerosis carotid atherosclerosis ASA, statin as above. Surveillance monitoring  hL cont crestor  Mild valvular heart disease Surveillance monitoring advised. No SBE prophylaxis required.  HTN lightheadedness improved with splitting lisinopril dosing AM/PM cont current dosing add BB as above low salt diet  Multiple coronary risk factors, including diabetes, hypertension, family history CVD Risk factor modification, med rx as above Glucose management with primary physician. follow-up with primary physician regarding management of diabetes favor use of glp/SGLT2 inhibitor

## 2024-01-10 NOTE — HISTORY OF PRESENT ILLNESS
[FreeTextEntry1] : TNAA MCKEON  is a 78 year old  M Last seen February 2023.  Interim blood work.  March 2023 LDL 92, A1c 7.0.  Last EKG demonstrates sinus rhythm with a leftward axis.  Last visit a monitor was recommended.  His major difficulty is with his eyes.  He follows with ophthalmology.  He underwent prior surgery.  Initially there was clinical improvement.  He also reports feeling more fatigued.  I have reduced his dose of metoprolol.  Discussed the importance of risk factor modification for cardiovascular event reduction.  Last blood work October 2023 triglycerides 163 LDL 88.  Restart statin therapy.  Increased dose of Crestor.  Last A1c 7.3.  There was technical difficulties with the monitor.  A repeat monitor was performed.  He reports feeling more fatigued and sluggish.  I have reduced his dose of metoprolol.  On recent monitor his average heart rate was 59 with benign premature beats and no significant ectopy.  He had a mechanical fall.  Continue aspirin, high intensity statin and antihypertensive therapy.  History of hypertension, diabetes, family history of cardiovascular disease, atherosclerosis, mild VHD, nonobx CAD  There is no prior history of a clinical myocardial infarction, coronary revascularization.  There is no history of symptomatic congestive heart failure rheumatic heart disease There is no history of symptomatic arrhythmias including atrial fibrillation.  There are symptoms of fatigue.  He has been less active.  He adjusted his BP meds, split lisinopril dosing morning/night which has helped.  been off statin?  There is no exertional chest pain, pressure or discomfort.  There is no orthopnea.  There are no symptomatic palpitations syncope.  Echo 1/2023 LVEF 55-60% trace MR, aortic root 3.7cm , mild aortic sclerosis   NST 1/2023 4m 30s exercise no ischemia by EKG. PAT and NSVT in immediate recovery.  MPI normal myocardial perfusion, no evidence of infarction or inducible ischemia  Carotid Doppler 2/19 demonstrated mild atherosclerosis.  Abdominal ultrasound 2/19 demonstrates aortic atherosclerosis. No aneurysm.  CTA 8/2/21, Calcium score 847 limited LAD and 1st diag <50% stenosis Ramus <40% FFR shows no hemodynamically significant stenosis of the LAD   [place] industry.  Significant family history of carotid disease and father had initial cardiac event in his 50s.  CTA severe CAC non obsx No angina, normal EF normal myocardial perfusion, no evidence of infarction or inducible ischemia ASA, statin, glucose management. Advised to call right away for further eval if any exertional sx occur.  NSVT, PAT in immediate recovery on ETT event monitor results are pending no relative sx given concomitant CAD and HTN in office past several visits will add toprol 25mg QHS  history of aortic atherosclerosis carotid atherosclerosis ASA, statin as above. Surveillance monitoring  hL cont crestor  Mild valvular heart disease Surveillance monitoring advised. No SBE prophylaxis required.  HTN lightheadedness improved with splitting lisinopril dosing AM/PM cont current dosing add BB as above low salt diet  Multiple coronary risk factors, including diabetes, hypertension, family history CVD Risk factor modification, med rx as above Glucose management with primary physician. follow-up with primary physician regarding management of diabetes favor use of glp/SGLT2 inhibitor

## 2024-04-08 ENCOUNTER — APPOINTMENT (OUTPATIENT)
Dept: INTERNAL MEDICINE | Facility: CLINIC | Age: 79
End: 2024-04-08
Payer: MEDICARE

## 2024-04-08 VITALS
WEIGHT: 202 LBS | DIASTOLIC BLOOD PRESSURE: 76 MMHG | SYSTOLIC BLOOD PRESSURE: 126 MMHG | HEIGHT: 70 IN | BODY MASS INDEX: 28.92 KG/M2

## 2024-04-08 PROCEDURE — 99213 OFFICE O/P EST LOW 20 MIN: CPT

## 2024-04-08 PROCEDURE — G2211 COMPLEX E/M VISIT ADD ON: CPT

## 2024-04-08 NOTE — ASSESSMENT
[FreeTextEntry1] : Diabetes-most recent A1c was 7.3.  We again discussed various medication options as well as the need for more exercise and activity.  Discussed that Ozempic or Mounjaro could be good choices to replace Januvia because of its additional benefits as well as the possibility of some weight loss.  Is something he will consider. Wants to follow-up in approximately 4 to 6 weeks for repeat lab testing first For now he will remain on Januvia 100 mg daily.

## 2024-04-08 NOTE — HISTORY OF PRESENT ILLNESS
[de-identified] : Presents for follow-up and medication management. Has a history of diabetes, hypertension and hyperlipidemia. Most recent A1c was 7.3 on Januvia 100 mg only.  Metformin have to stop due to GI side effects. Has been having ongoing issues with malaise/fatigue and his metoprolol dose was recently decreased.

## 2024-04-08 NOTE — REVIEW OF SYSTEMS
[Fatigue] : fatigue [Chest Pain] : no chest pain [Shortness Of Breath] : no shortness of breath [Dizziness] : no dizziness [Fainting] : no fainting

## 2024-05-13 ENCOUNTER — APPOINTMENT (OUTPATIENT)
Dept: INTERNAL MEDICINE | Facility: CLINIC | Age: 79
End: 2024-05-13
Payer: MEDICARE

## 2024-05-13 VITALS
BODY MASS INDEX: 28.63 KG/M2 | HEIGHT: 70 IN | SYSTOLIC BLOOD PRESSURE: 128 MMHG | DIASTOLIC BLOOD PRESSURE: 74 MMHG | WEIGHT: 200 LBS

## 2024-05-13 DIAGNOSIS — E11.9 TYPE 2 DIABETES MELLITUS W/OUT COMPLICATIONS: ICD-10-CM

## 2024-05-13 DIAGNOSIS — I10 ESSENTIAL (PRIMARY) HYPERTENSION: ICD-10-CM

## 2024-05-13 DIAGNOSIS — E78.5 HYPERLIPIDEMIA, UNSPECIFIED: ICD-10-CM

## 2024-05-13 PROCEDURE — 99214 OFFICE O/P EST MOD 30 MIN: CPT

## 2024-05-13 PROCEDURE — 36415 COLL VENOUS BLD VENIPUNCTURE: CPT

## 2024-05-13 PROCEDURE — G2211 COMPLEX E/M VISIT ADD ON: CPT

## 2024-05-13 NOTE — HISTORY OF PRESENT ILLNESS
[de-identified] : Presents for follow-up fasting labs and prescription renewals. Has a history of diabetes, hypertension and hyperlipidemia. Is a presently on Januvia 100 mg daily and his last A1c was 7.3.  At that time we discussed the idea of possibly switching to Mounjaro in place of Januvia. He was to have his A1c checked once more prior to consider any change. Has been well otherwise without recent illness.

## 2024-05-13 NOTE — ASSESSMENT
[FreeTextEntry1] : Diabetes-follow-up A1c was sent.  His most recent from 10/23 was 7.3. We did discuss again the possibility of switching from Januvia to Mounjaro or Ozempic if it does still remain elevated. He was not able to tolerate metformin in the past.  [id number] lipid profile was sent.  His most recent from 10/23 was 169 with an LDL of 88. For now he will continue Crestor 20 mg daily.  Hypertension-his blood pressure remains well-controlled with lisinopril 10 mg twice daily and metoprolol ER 25 mg daily.

## 2024-05-15 LAB
ALBUMIN SERPL ELPH-MCNC: 4.7 G/DL
ALP BLD-CCNC: 68 U/L
ALT SERPL-CCNC: 25 U/L
ANION GAP SERPL CALC-SCNC: 19 MMOL/L
AST SERPL-CCNC: 28 U/L
BASOPHILS # BLD AUTO: 0.06 K/UL
BASOPHILS NFR BLD AUTO: 1.2 %
BILIRUB SERPL-MCNC: 1.2 MG/DL
BUN SERPL-MCNC: 15 MG/DL
CALCIUM SERPL-MCNC: 9.2 MG/DL
CHLORIDE SERPL-SCNC: 99 MMOL/L
CHOLEST SERPL-MCNC: 167 MG/DL
CO2 SERPL-SCNC: 18 MMOL/L
CREAT SERPL-MCNC: 1.16 MG/DL
EGFR: 65 ML/MIN/1.73M2
EOSINOPHIL # BLD AUTO: 0.09 K/UL
EOSINOPHIL NFR BLD AUTO: 1.7 %
ESTIMATED AVERAGE GLUCOSE: 140 MG/DL
GLUCOSE SERPL-MCNC: 118 MG/DL
HBA1C MFR BLD HPLC: 6.5 %
HCT VFR BLD CALC: 47.4 %
HDLC SERPL-MCNC: 50 MG/DL
HGB BLD-MCNC: 15.4 G/DL
IMM GRANULOCYTES NFR BLD AUTO: 0.8 %
LDLC SERPL CALC-MCNC: 97 MG/DL
LYMPHOCYTES # BLD AUTO: 1.16 K/UL
LYMPHOCYTES NFR BLD AUTO: 22.3 %
MAN DIFF?: NORMAL
MCHC RBC-ENTMCNC: 29.8 PG
MCHC RBC-ENTMCNC: 32.5 GM/DL
MCV RBC AUTO: 91.9 FL
MONOCYTES # BLD AUTO: 0.5 K/UL
MONOCYTES NFR BLD AUTO: 9.6 %
NEUTROPHILS # BLD AUTO: 3.36 K/UL
NEUTROPHILS NFR BLD AUTO: 64.4 %
NONHDLC SERPL-MCNC: 118 MG/DL
PLATELET # BLD AUTO: 217 K/UL
POTASSIUM SERPL-SCNC: 4.8 MMOL/L
PROT SERPL-MCNC: 7.2 G/DL
RBC # BLD: 5.16 M/UL
RBC # FLD: 12.7 %
SODIUM SERPL-SCNC: 137 MMOL/L
TRIGL SERPL-MCNC: 113 MG/DL
WBC # FLD AUTO: 5.21 K/UL

## 2024-06-24 RX ORDER — LISINOPRIL 10 MG/1
10 TABLET ORAL
Qty: 180 | Refills: 1 | Status: ACTIVE | COMMUNITY
Start: 2021-01-12 | End: 1900-01-01

## 2024-07-15 ENCOUNTER — NON-APPOINTMENT (OUTPATIENT)
Age: 79
End: 2024-07-15

## 2024-07-15 ENCOUNTER — APPOINTMENT (OUTPATIENT)
Dept: CARDIOLOGY | Facility: CLINIC | Age: 79
End: 2024-07-15
Payer: MEDICARE

## 2024-07-15 VITALS
OXYGEN SATURATION: 96 % | DIASTOLIC BLOOD PRESSURE: 60 MMHG | HEIGHT: 70 IN | SYSTOLIC BLOOD PRESSURE: 108 MMHG | BODY MASS INDEX: 28.35 KG/M2 | HEART RATE: 73 BPM | WEIGHT: 198 LBS

## 2024-07-15 DIAGNOSIS — Z71.89 OTHER SPECIFIED COUNSELING: ICD-10-CM

## 2024-07-15 DIAGNOSIS — I70.0 ATHEROSCLEROSIS OF AORTA: ICD-10-CM

## 2024-07-15 DIAGNOSIS — Z82.49 FAMILY HISTORY OF ISCHEMIC HEART DISEASE AND OTHER DISEASES OF THE CIRCULATORY SYSTEM: ICD-10-CM

## 2024-07-15 DIAGNOSIS — I10 ESSENTIAL (PRIMARY) HYPERTENSION: ICD-10-CM

## 2024-07-15 DIAGNOSIS — I47.29 OTHER VENTRICULAR TACHYCARDIA: ICD-10-CM

## 2024-07-15 DIAGNOSIS — E11.9 TYPE 2 DIABETES MELLITUS W/OUT COMPLICATIONS: ICD-10-CM

## 2024-07-15 DIAGNOSIS — R94.31 ABNORMAL ELECTROCARDIOGRAM [ECG] [EKG]: ICD-10-CM

## 2024-07-15 DIAGNOSIS — E78.5 HYPERLIPIDEMIA, UNSPECIFIED: ICD-10-CM

## 2024-07-15 DIAGNOSIS — I25.10 ATHEROSCLEROTIC HEART DISEASE OF NATIVE CORONARY ARTERY W/OUT ANGINA PECTORIS: ICD-10-CM

## 2024-07-15 PROCEDURE — 93000 ELECTROCARDIOGRAM COMPLETE: CPT

## 2024-07-15 PROCEDURE — 99214 OFFICE O/P EST MOD 30 MIN: CPT

## 2024-08-08 ENCOUNTER — RX RENEWAL (OUTPATIENT)
Age: 79
End: 2024-08-08

## 2024-08-23 ENCOUNTER — RX RENEWAL (OUTPATIENT)
Age: 79
End: 2024-08-23

## 2024-08-23 RX ORDER — AZELASTINE HYDROCHLORIDE 137 UG/1
0.1 SPRAY, METERED NASAL
Qty: 30 | Refills: 1 | Status: ACTIVE | COMMUNITY
Start: 2024-08-23 | End: 1900-01-01

## 2024-08-28 ENCOUNTER — APPOINTMENT (OUTPATIENT)
Dept: CARDIOLOGY | Facility: CLINIC | Age: 79
End: 2024-08-28
Payer: MEDICARE

## 2024-08-28 VITALS
HEART RATE: 56 BPM | WEIGHT: 198 LBS | OXYGEN SATURATION: 98 % | BODY MASS INDEX: 28.35 KG/M2 | HEIGHT: 70 IN | DIASTOLIC BLOOD PRESSURE: 64 MMHG | SYSTOLIC BLOOD PRESSURE: 116 MMHG

## 2024-08-28 DIAGNOSIS — I10 ESSENTIAL (PRIMARY) HYPERTENSION: ICD-10-CM

## 2024-08-28 DIAGNOSIS — E78.5 HYPERLIPIDEMIA, UNSPECIFIED: ICD-10-CM

## 2024-08-28 DIAGNOSIS — I25.10 ATHEROSCLEROTIC HEART DISEASE OF NATIVE CORONARY ARTERY W/OUT ANGINA PECTORIS: ICD-10-CM

## 2024-08-28 DIAGNOSIS — I47.29 OTHER VENTRICULAR TACHYCARDIA: ICD-10-CM

## 2024-08-28 DIAGNOSIS — R94.31 ABNORMAL ELECTROCARDIOGRAM [ECG] [EKG]: ICD-10-CM

## 2024-08-28 DIAGNOSIS — Z82.49 FAMILY HISTORY OF ISCHEMIC HEART DISEASE AND OTHER DISEASES OF THE CIRCULATORY SYSTEM: ICD-10-CM

## 2024-08-28 PROCEDURE — 93880 EXTRACRANIAL BILAT STUDY: CPT

## 2024-08-28 PROCEDURE — 93978 VASCULAR STUDY: CPT

## 2024-08-28 PROCEDURE — 93306 TTE W/DOPPLER COMPLETE: CPT

## 2024-08-28 PROCEDURE — 99214 OFFICE O/P EST MOD 30 MIN: CPT

## 2024-08-31 NOTE — HISTORY OF PRESENT ILLNESS
[FreeTextEntry1] : TANA MCKEON  is a 78 year old  M  History of hypertension, diabetes, family history of cardiovascular disease, atherosclerosis, mild VHD, nonobx CAD  There is no prior history of a clinical myocardial infarction, coronary revascularization. There is no history of symptomatic congestive heart failure rheumatic heart disease There is no history of symptomatic arrhythmias including atrial fibrillation.  There is no exertional chest pain, pressure or discomfort. There is no orthopnea. There are no symptomatic palpitations syncope.  He is now off beta-blocker.   His blood pressure remains controlled.   He also splits his dose of lisinopril.   Last visit he restarted statin. He does have stomach discomfort on it which improves off med.   Blood work May 2024 A1c 6.5 hemoglobin 15.4 creatinine 1.2 total cholesterol 167 LDL 97  (off statin) EKG demonstrates sinus rhythm low voltage prior infarct.   Echo 8/2024 EF 60-65% mild mitral calcification/MR, mild to mod PI, aortic sclerosis Abd US 8/2024 no AAA mild atherosclerosis  Carotid 8/2024 mild to mod calcified plaque BL progression monitor 11/2023 his average heart rate was 59 with benign premature beats and no significant ectopy.  NST 1/2023 4m 30s exercise no ischemia by EKG. PAT and NSVT in immediate recovery. MPI normal myocardial perfusion, no evidence of infarction or inducible ischemia  CTA 8/2/21, Calcium score 847 limited LAD and 1st diag <50% stenosis Ramus <40% FFR shows no hemodynamically significant stenosis of the LAD  [place] from [place] industry. Significant family history of carotid disease and father had initial cardiac event in his 50s.

## 2024-08-31 NOTE — ASSESSMENT
[FreeTextEntry1] : TANA MCKEON is a 78 year old M who presents today Aug 28, 2024 with the above history and the following active issues:   Carotid atherosclerosis, mild to mod progressed from prior cont ASA, optimize statin   CTA severe CAC non obsx NSVT Normal perfusion on MPI No angina cont med rx - asa statin bb, glucose management Advised to call right away for further eval if any exertional sx occur.  HLD LDL 97 off statin pt has GI discomfort on crestor 20mg daily will reduce to 10mg daily goal LDL <70 followup labs in 2 mo, call w results, add zetia if not at goal   Mild valvular heart disease Surveillance monitoring advised. No SBE prophylaxis required.  HTN lightheadedness improved with splitting lisinopril dosing AM/PM low salt diet  Multiple coronary risk factors, including diabetes, hypertension, family history CVD Risk factor modification, med rx as above Glucose management with primary physician. follow-up with primary physician regarding management of diabetes favor use of glp/SGLT2 inhibitor Routine cardiology followup 6 mo  Sincerely,  BRENDA Mckenna Patients history, testing, and plan reviewed with supervising MD: Dr. Corky Donald

## 2024-09-17 ENCOUNTER — RX RENEWAL (OUTPATIENT)
Age: 79
End: 2024-09-17

## 2024-09-19 ENCOUNTER — APPOINTMENT (OUTPATIENT)
Dept: OPHTHALMOLOGY | Facility: CLINIC | Age: 79
End: 2024-09-19
Payer: MEDICARE

## 2024-09-19 ENCOUNTER — NON-APPOINTMENT (OUTPATIENT)
Age: 79
End: 2024-09-19

## 2024-09-19 PROCEDURE — 92014 COMPRE OPH EXAM EST PT 1/>: CPT

## 2024-11-06 ENCOUNTER — NON-APPOINTMENT (OUTPATIENT)
Age: 79
End: 2024-11-06

## 2025-01-07 ENCOUNTER — NON-APPOINTMENT (OUTPATIENT)
Age: 80
End: 2025-01-07

## 2025-03-04 ENCOUNTER — NON-APPOINTMENT (OUTPATIENT)
Age: 80
End: 2025-03-04

## 2025-03-05 ENCOUNTER — NON-APPOINTMENT (OUTPATIENT)
Age: 80
End: 2025-03-05

## 2025-03-05 ENCOUNTER — APPOINTMENT (OUTPATIENT)
Dept: CARDIOLOGY | Facility: CLINIC | Age: 80
End: 2025-03-05
Payer: MEDICARE

## 2025-03-05 VITALS
HEIGHT: 70 IN | OXYGEN SATURATION: 96 % | DIASTOLIC BLOOD PRESSURE: 68 MMHG | BODY MASS INDEX: 28.63 KG/M2 | WEIGHT: 200 LBS | SYSTOLIC BLOOD PRESSURE: 122 MMHG | HEART RATE: 76 BPM

## 2025-03-05 DIAGNOSIS — R07.9 CHEST PAIN, UNSPECIFIED: ICD-10-CM

## 2025-03-05 DIAGNOSIS — Z71.89 OTHER SPECIFIED COUNSELING: ICD-10-CM

## 2025-03-05 DIAGNOSIS — R53.83 OTHER FATIGUE: ICD-10-CM

## 2025-03-05 DIAGNOSIS — I10 ESSENTIAL (PRIMARY) HYPERTENSION: ICD-10-CM

## 2025-03-05 DIAGNOSIS — Z82.49 FAMILY HISTORY OF ISCHEMIC HEART DISEASE AND OTHER DISEASES OF THE CIRCULATORY SYSTEM: ICD-10-CM

## 2025-03-05 DIAGNOSIS — E78.5 HYPERLIPIDEMIA, UNSPECIFIED: ICD-10-CM

## 2025-03-05 DIAGNOSIS — E11.9 TYPE 2 DIABETES MELLITUS W/OUT COMPLICATIONS: ICD-10-CM

## 2025-03-05 DIAGNOSIS — I25.10 ATHEROSCLEROTIC HEART DISEASE OF NATIVE CORONARY ARTERY W/OUT ANGINA PECTORIS: ICD-10-CM

## 2025-03-05 PROCEDURE — 99214 OFFICE O/P EST MOD 30 MIN: CPT

## 2025-03-05 PROCEDURE — 93000 ELECTROCARDIOGRAM COMPLETE: CPT

## 2025-03-18 ENCOUNTER — APPOINTMENT (OUTPATIENT)
Dept: CARDIOLOGY | Facility: CLINIC | Age: 80
End: 2025-03-18
Payer: MEDICARE

## 2025-03-18 PROCEDURE — 93015 CV STRESS TEST SUPVJ I&R: CPT

## 2025-03-18 PROCEDURE — A9502: CPT | Mod: JZ

## 2025-03-18 PROCEDURE — 78452 HT MUSCLE IMAGE SPECT MULT: CPT

## 2025-04-02 ENCOUNTER — APPOINTMENT (OUTPATIENT)
Dept: CARDIOLOGY | Facility: CLINIC | Age: 80
End: 2025-04-02
Payer: MEDICARE

## 2025-04-02 VITALS
OXYGEN SATURATION: 99 % | HEART RATE: 63 BPM | DIASTOLIC BLOOD PRESSURE: 74 MMHG | WEIGHT: 195 LBS | SYSTOLIC BLOOD PRESSURE: 136 MMHG | BODY MASS INDEX: 27.98 KG/M2

## 2025-04-02 DIAGNOSIS — E78.5 HYPERLIPIDEMIA, UNSPECIFIED: ICD-10-CM

## 2025-04-02 DIAGNOSIS — I25.10 ATHEROSCLEROTIC HEART DISEASE OF NATIVE CORONARY ARTERY W/OUT ANGINA PECTORIS: ICD-10-CM

## 2025-04-02 DIAGNOSIS — Z71.89 OTHER SPECIFIED COUNSELING: ICD-10-CM

## 2025-04-02 DIAGNOSIS — I10 ESSENTIAL (PRIMARY) HYPERTENSION: ICD-10-CM

## 2025-04-02 DIAGNOSIS — Z82.49 FAMILY HISTORY OF ISCHEMIC HEART DISEASE AND OTHER DISEASES OF THE CIRCULATORY SYSTEM: ICD-10-CM

## 2025-04-02 DIAGNOSIS — E11.9 TYPE 2 DIABETES MELLITUS W/OUT COMPLICATIONS: ICD-10-CM

## 2025-04-02 DIAGNOSIS — R53.83 OTHER FATIGUE: ICD-10-CM

## 2025-04-02 DIAGNOSIS — R07.9 CHEST PAIN, UNSPECIFIED: ICD-10-CM

## 2025-04-02 PROCEDURE — 99214 OFFICE O/P EST MOD 30 MIN: CPT

## 2025-04-02 RX ORDER — ATORVASTATIN CALCIUM 20 MG/1
20 TABLET, FILM COATED ORAL
Qty: 90 | Refills: 3 | Status: ACTIVE | COMMUNITY
Start: 2025-04-02 | End: 1900-01-01

## 2025-04-17 ENCOUNTER — APPOINTMENT (OUTPATIENT)
Dept: INTERNAL MEDICINE | Facility: CLINIC | Age: 80
End: 2025-04-17
Payer: MEDICARE

## 2025-04-17 VITALS
WEIGHT: 195 LBS | SYSTOLIC BLOOD PRESSURE: 132 MMHG | DIASTOLIC BLOOD PRESSURE: 84 MMHG | HEIGHT: 70 IN | BODY MASS INDEX: 27.92 KG/M2

## 2025-04-17 DIAGNOSIS — N40.0 BENIGN PROSTATIC HYPERPLASIA WITHOUT LOWER URINARY TRACT SYMPMS: ICD-10-CM

## 2025-04-17 DIAGNOSIS — E11.9 TYPE 2 DIABETES MELLITUS W/OUT COMPLICATIONS: ICD-10-CM

## 2025-04-17 DIAGNOSIS — E78.5 HYPERLIPIDEMIA, UNSPECIFIED: ICD-10-CM

## 2025-04-17 DIAGNOSIS — Z00.00 ENCOUNTER FOR GENERAL ADULT MEDICAL EXAMINATION W/OUT ABNORMAL FINDINGS: ICD-10-CM

## 2025-04-17 DIAGNOSIS — I25.10 ATHEROSCLEROTIC HEART DISEASE OF NATIVE CORONARY ARTERY W/OUT ANGINA PECTORIS: ICD-10-CM

## 2025-04-17 DIAGNOSIS — I10 ESSENTIAL (PRIMARY) HYPERTENSION: ICD-10-CM

## 2025-04-17 PROCEDURE — G0439: CPT

## 2025-04-17 PROCEDURE — 36415 COLL VENOUS BLD VENIPUNCTURE: CPT

## 2025-04-18 LAB
ALBUMIN SERPL ELPH-MCNC: 4.3 G/DL
ALP BLD-CCNC: 70 U/L
ALT SERPL-CCNC: 20 U/L
ANION GAP SERPL CALC-SCNC: 14 MMOL/L
AST SERPL-CCNC: 21 U/L
BASOPHILS # BLD AUTO: 0.04 K/UL
BASOPHILS NFR BLD AUTO: 0.6 %
BILIRUB SERPL-MCNC: 1.2 MG/DL
BUN SERPL-MCNC: 10 MG/DL
CALCIUM SERPL-MCNC: 9.2 MG/DL
CHLORIDE SERPL-SCNC: 103 MMOL/L
CHOLEST SERPL-MCNC: 122 MG/DL
CK SERPL-CCNC: 63 U/L
CO2 SERPL-SCNC: 21 MMOL/L
CREAT SERPL-MCNC: 1.24 MG/DL
CRP SERPL HS-MCNC: 1.05 MG/L
EGFRCR SERPLBLD CKD-EPI 2021: 60 ML/MIN/1.73M2
EOSINOPHIL # BLD AUTO: 0.11 K/UL
EOSINOPHIL NFR BLD AUTO: 1.7 %
ESTIMATED AVERAGE GLUCOSE: 143 MG/DL
GLUCOSE SERPL-MCNC: 105 MG/DL
HBA1C MFR BLD HPLC: 6.6 %
HCT VFR BLD CALC: 46.1 %
HDLC SERPL-MCNC: 49 MG/DL
HGB BLD-MCNC: 15.4 G/DL
IMM GRANULOCYTES NFR BLD AUTO: 0.6 %
LDLC SERPL-MCNC: 55 MG/DL
LYMPHOCYTES # BLD AUTO: 1.95 K/UL
LYMPHOCYTES NFR BLD AUTO: 31 %
MAGNESIUM SERPL-MCNC: 2 MG/DL
MAN DIFF?: NORMAL
MCHC RBC-ENTMCNC: 30.2 PG
MCHC RBC-ENTMCNC: 33.4 G/DL
MCV RBC AUTO: 90.4 FL
MONOCYTES # BLD AUTO: 0.69 K/UL
MONOCYTES NFR BLD AUTO: 11 %
NEUTROPHILS # BLD AUTO: 3.47 K/UL
NEUTROPHILS NFR BLD AUTO: 55.1 %
NONHDLC SERPL-MCNC: 73 MG/DL
NT-PROBNP SERPL-MCNC: 241 PG/ML
PLATELET # BLD AUTO: 213 K/UL
POTASSIUM SERPL-SCNC: 4.4 MMOL/L
PROT SERPL-MCNC: 7.1 G/DL
PSA SERPL-MCNC: 1.82 NG/ML
RBC # BLD: 5.1 M/UL
RBC # FLD: 12.9 %
SODIUM SERPL-SCNC: 138 MMOL/L
TRIGL SERPL-MCNC: 93 MG/DL
WBC # FLD AUTO: 6.3 K/UL

## 2025-04-21 LAB — APO LP(A) SERPL-MCNC: <9 NMOL/L

## 2025-04-22 ENCOUNTER — RX RENEWAL (OUTPATIENT)
Age: 80
End: 2025-04-22

## 2025-07-15 ENCOUNTER — APPOINTMENT (OUTPATIENT)
Dept: CARDIOLOGY | Facility: CLINIC | Age: 80
End: 2025-07-15
Payer: MEDICARE

## 2025-07-15 VITALS
SYSTOLIC BLOOD PRESSURE: 98 MMHG | BODY MASS INDEX: 27.06 KG/M2 | HEART RATE: 64 BPM | HEIGHT: 70 IN | DIASTOLIC BLOOD PRESSURE: 54 MMHG | WEIGHT: 189 LBS | OXYGEN SATURATION: 98 %

## 2025-07-15 PROCEDURE — 99214 OFFICE O/P EST MOD 30 MIN: CPT

## 2025-07-30 LAB — HBA1C MFR BLD HPLC: 6.4
